# Patient Record
Sex: MALE | Race: BLACK OR AFRICAN AMERICAN | NOT HISPANIC OR LATINO | Employment: FULL TIME | URBAN - NONMETROPOLITAN AREA
[De-identification: names, ages, dates, MRNs, and addresses within clinical notes are randomized per-mention and may not be internally consistent; named-entity substitution may affect disease eponyms.]

---

## 2018-06-18 ENCOUNTER — TRANSCRIBE ORDERS (OUTPATIENT)
Dept: ORTHOPEDIC SURGERY | Facility: CLINIC | Age: 53
End: 2018-06-18

## 2018-06-18 DIAGNOSIS — M54.5 LOW BACK PAIN, UNSPECIFIED BACK PAIN LATERALITY, UNSPECIFIED CHRONICITY, WITH SCIATICA PRESENCE UNSPECIFIED: Primary | ICD-10-CM

## 2018-07-06 ENCOUNTER — OFFICE VISIT (OUTPATIENT)
Dept: ORTHOPEDIC SURGERY | Facility: CLINIC | Age: 53
End: 2018-07-06

## 2018-07-06 ENCOUNTER — DOCUMENTATION (OUTPATIENT)
Dept: ORTHOPEDIC SURGERY | Facility: CLINIC | Age: 53
End: 2018-07-06

## 2018-07-06 VITALS — BODY MASS INDEX: 31.02 KG/M2 | HEIGHT: 72 IN | WEIGHT: 229 LBS

## 2018-07-06 DIAGNOSIS — M54.5 LOW BACK PAIN, UNSPECIFIED BACK PAIN LATERALITY, UNSPECIFIED CHRONICITY, WITH SCIATICA PRESENCE UNSPECIFIED: Primary | ICD-10-CM

## 2018-07-06 DIAGNOSIS — M51.36 DDD (DEGENERATIVE DISC DISEASE), LUMBAR: ICD-10-CM

## 2018-07-06 DIAGNOSIS — G89.29 CHRONIC MIDLINE LOW BACK PAIN WITHOUT SCIATICA: Primary | ICD-10-CM

## 2018-07-06 DIAGNOSIS — M48.062 SPINAL STENOSIS OF LUMBAR REGION WITH NEUROGENIC CLAUDICATION: ICD-10-CM

## 2018-07-06 DIAGNOSIS — M54.50 CHRONIC MIDLINE LOW BACK PAIN WITHOUT SCIATICA: Primary | ICD-10-CM

## 2018-07-06 PROCEDURE — 99203 OFFICE O/P NEW LOW 30 MIN: CPT | Performed by: ORTHOPAEDIC SURGERY

## 2018-07-06 RX ORDER — LOSARTAN POTASSIUM AND HYDROCHLOROTHIAZIDE 25; 100 MG/1; MG/1
TABLET ORAL DAILY
Refills: 3 | COMMUNITY
Start: 2018-06-07

## 2018-07-06 RX ORDER — PEN NEEDLE, DIABETIC 32GX 5/32"
NEEDLE, DISPOSABLE MISCELLANEOUS
Refills: 5 | COMMUNITY
Start: 2018-06-07

## 2018-07-06 RX ORDER — CYCLOBENZAPRINE HCL 10 MG
TABLET ORAL
COMMUNITY
Start: 2018-06-13

## 2018-07-06 RX ORDER — ESCITALOPRAM OXALATE 10 MG/1
TABLET ORAL
Refills: 2 | COMMUNITY
Start: 2018-06-07

## 2018-07-06 RX ORDER — INSULIN GLARGINE 100 [IU]/ML
INJECTION, SOLUTION SUBCUTANEOUS
Refills: 0 | COMMUNITY
Start: 2018-06-13
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
## 2018-07-16 ENCOUNTER — HOSPITAL ENCOUNTER (OUTPATIENT)
Dept: MRI IMAGING | Facility: HOSPITAL | Age: 53
Discharge: HOME | End: 2018-07-16
Attending: ORTHOPAEDIC SURGERY | Admitting: ORTHOPAEDIC SURGERY

## 2018-07-16 DIAGNOSIS — M48.062 SPINAL STENOSIS OF LUMBAR REGION WITH NEUROGENIC CLAUDICATION: ICD-10-CM

## 2018-07-16 DIAGNOSIS — M51.36 DDD (DEGENERATIVE DISC DISEASE), LUMBAR: ICD-10-CM

## 2018-07-16 PROCEDURE — 72148 MRI LUMBAR SPINE W/O DYE: CPT

## 2018-08-08 ENCOUNTER — OFFICE VISIT (OUTPATIENT)
Dept: ORTHOPEDIC SURGERY | Facility: CLINIC | Age: 53
End: 2018-08-08

## 2018-08-08 VITALS — BODY MASS INDEX: 31.02 KG/M2 | HEIGHT: 72 IN | WEIGHT: 229 LBS

## 2018-08-08 DIAGNOSIS — M54.50 CHRONIC MIDLINE LOW BACK PAIN WITHOUT SCIATICA: ICD-10-CM

## 2018-08-08 DIAGNOSIS — M54.5 LOW BACK PAIN, UNSPECIFIED BACK PAIN LATERALITY, UNSPECIFIED CHRONICITY, WITH SCIATICA PRESENCE UNSPECIFIED: Primary | ICD-10-CM

## 2018-08-08 DIAGNOSIS — M48.062 SPINAL STENOSIS OF LUMBAR REGION WITH NEUROGENIC CLAUDICATION: ICD-10-CM

## 2018-08-08 DIAGNOSIS — G89.29 CHRONIC MIDLINE LOW BACK PAIN WITHOUT SCIATICA: ICD-10-CM

## 2018-08-08 DIAGNOSIS — M51.36 DDD (DEGENERATIVE DISC DISEASE), LUMBAR: ICD-10-CM

## 2018-08-08 PROCEDURE — 99213 OFFICE O/P EST LOW 20 MIN: CPT | Performed by: ORTHOPAEDIC SURGERY

## 2018-08-08 NOTE — PROGRESS NOTES
"Khanh Quinteros is a 52 y.o. male returns for     Chief Complaint   Patient presents with   • Lumbar Spine - Follow-up   • Results     MRI Faith 7/16/18       HISTORY OF PRESENT ILLNESS:       CONCURRENT MEDICAL HISTORY:    The following portions of the patient's history were reviewed and updated as appropriate: allergies, current medications, past family history, past medical history, past social history, past surgical history and problem list.     53 y/o complains of back pain, low back, the pain is dull, intermittent.  Worse with low pressure weather patterns.  C/o numbness of both legs left > right.  Aleve helps.  Flexeril helps.    No weakness  No nighttime awakening pain.    ROS  No fevers or chills.  No chest pain or shortness of air.  No GI or  disturbances.    PHYSICAL EXAMINATION:       Ht 182.9 cm (72\")   Wt 104 kg (229 lb)   BMI 31.06 kg/m²     Physical Exam   Constitutional: He is oriented to person, place, and time. He appears well-developed and well-nourished. No distress.   HENT:   Head: Normocephalic.   Mouth/Throat: No oropharyngeal exudate.   Eyes: Pupils are equal, round, and reactive to light. No scleral icterus.   Neck: No JVD present. No tracheal deviation present. No thyromegaly present.   Cardiovascular: Normal rate and intact distal pulses.    Pulmonary/Chest: Effort normal. No respiratory distress. He has no wheezes. He has no rales.   Abdominal: Soft. He exhibits no distension. There is no tenderness. There is no guarding.   Neurological: He is alert and oriented to person, place, and time. He displays normal reflexes. No cranial nerve deficit. Coordination normal.   Skin: Skin is warm and dry. Capillary refill takes less than 2 seconds. No erythema.   Psychiatric: He has a normal mood and affect. His behavior is normal.       GAIT:     [x]  Normal  []  Antalgic    Assistive device: [x]  None  []  Walker     []  Crutches  []  Cane     []  Wheelchair  []  Stretcher    Right Ankle " Exam     Muscle Strength   Dorsiflexion:  5/5  Plantar flexion:  5/5  Anterior tibial:  5/5  Posterior tibial:  5/5  Gastrocsoleus:  5/5  Peroneal muscle:  5/5      Left Ankle Exam     Muscle Strength   Dorsiflexion:  5/5   Plantar flexion:  5/5   Anterior tibial:  5/5   Posterior tibial:  5/5  Gastrocsoleus:  5/5  Peroneal muscle:  5/5      Right Knee Exam     Range of Motion   Extension: normal   Flexion: normal     Muscle Strength     The patient has normal right knee strength.    Other   Erythema: absent  Scars: absent      Left Knee Exam     Range of Motion   Extension: normal   Flexion: normal     Muscle Strength     The patient has normal left knee strength.    Other   Erythema: absent  Scars: absent      Back Exam     Tenderness   The patient is experiencing tenderness in the lumbar.    Range of Motion   Extension: 10   Flexion: 60   Lateral Bend Right: 10   Lateral Bend Left: 10   Rotation Right: 10   Rotation Left: 10     Muscle Strength   Right Quadriceps:  5/5   Left Quadriceps:  5/5   Right Hamstrings:  5/5   Left Hamstrings:  5/5     Tests   Straight leg raise right: negative  Straight leg raise left: negative    Other   Erythema: no back redness  Scars: absent              Mri Lumbar Spine Without Contrast    Result Date: 7/16/2018  Narrative: MRI lumbar spine. HISTORY: Back pain. Spinal stenosis. Prior exam lumbar spine x-ray July 6, 2018. Normal intervertebral discs. No evidence of any disc protrusion. No central canal stenosis. Facet arthrosis at L3-L4, L4-L5, L5-S1. Mild left-sided foraminal stenosis at L4-L5 and L5-S1. MRI lumbar spine without contrast is otherwise remarkable.     Impression: CONCLUSION: Multilevel facet arthrosis. This causes left-sided foraminal stenosis at L4-L5 and L5-S1. Otherwise unremarkable MRI lumbar spine. No evidence of any disc protrusion or central canal stenosis. No bony destructive lesions are present. Electronically signed by:  Dom Triana MD  7/16/2018 8:40 PM  CDT Workstation: [id number]    I reviewed the MRI of lumbar spine performed 7/16/2018, the MRI shows lumbar degenerative discogenic changes, mild moderate spinal stenosis of L3-4, L4-5 and L5-S1.          ASSESSMENT:    Diagnoses and all orders for this visit:    Low back pain, unspecified back pain laterality, unspecified chronicity, with sciatica presence unspecified  -     Ambulatory Referral to Physical Therapy Evaluate and treat, Ortho; Electrotherapy; Tens (Home), Iontophoresis; Soft Tissue Mobilizaton; Stretching, ROM, Strengthening; Full weight bearing    Chronic midline low back pain without sciatica  -     Ambulatory Referral to Physical Therapy Evaluate and treat, Ortho; Electrotherapy; Tens (Home), Iontophoresis; Soft Tissue Mobilizaton; Stretching, ROM, Strengthening; Full weight bearing    Spinal stenosis of lumbar region with neurogenic claudication    DDD (degenerative disc disease), lumbar          PLAN    I reviewed MRI discussed non operative treatment options.  All questions are answered.  I offerred a lumbar epidural steroid injection, which he has declined.  I advised him for weight loss.    Physical therapy is ordered for him.      Javan Matos MD

## 2018-08-22 ENCOUNTER — HOSPITAL ENCOUNTER (OUTPATIENT)
Dept: PHYSICAL THERAPY | Facility: HOSPITAL | Age: 53
Setting detail: THERAPIES SERIES
Discharge: HOME | End: 2018-08-22
Attending: ORTHOPAEDIC SURGERY

## 2018-08-22 DIAGNOSIS — M54.50 CHRONIC MIDLINE LOW BACK PAIN WITHOUT SCIATICA: Primary | ICD-10-CM

## 2018-08-22 DIAGNOSIS — G89.29 CHRONIC MIDLINE LOW BACK PAIN WITHOUT SCIATICA: Primary | ICD-10-CM

## 2018-08-22 PROCEDURE — 97110 THERAPEUTIC EXERCISES: CPT | Performed by: PHYSICAL THERAPIST

## 2018-08-22 PROCEDURE — 97162 PT EVAL MOD COMPLEX 30 MIN: CPT | Performed by: PHYSICAL THERAPIST
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
## 2018-08-23 ENCOUNTER — HOSPITAL ENCOUNTER (OUTPATIENT)
Dept: PHYSICAL THERAPY | Facility: HOSPITAL | Age: 53
Setting detail: THERAPIES SERIES
Discharge: HOME | End: 2018-08-23

## 2018-08-23 DIAGNOSIS — M54.50 CHRONIC MIDLINE LOW BACK PAIN WITHOUT SCIATICA: Primary | ICD-10-CM

## 2018-08-23 DIAGNOSIS — G89.29 CHRONIC MIDLINE LOW BACK PAIN WITHOUT SCIATICA: Primary | ICD-10-CM

## 2018-08-23 PROCEDURE — G0283 ELEC STIM OTHER THAN WOUND: HCPCS | Performed by: PHYSICAL THERAPY ASSISTANT

## 2018-08-23 PROCEDURE — 97110 THERAPEUTIC EXERCISES: CPT | Performed by: PHYSICAL THERAPY ASSISTANT

## 2018-08-23 NOTE — THERAPY TREATMENT NOTE
Outpatient Physical Therapy Ortho Treatment Note  [facility]     Patient Name: Khanh Quinteros  : 1965  MRN: [id number]  Today's Date: 2018      Visit Date: 2018  Reports N/A% of improvement.  Attended 2/2 visits.  Insurance available: medical necessity  Next MD appt: [id number] .  Recertification: 2018.  Visit Dx:    ICD-10-CM ICD-9-CM   1. Chronic midline low back pain without sciatica M54.5 724.2    G89.29 338.29       Patient Active Problem List   Diagnosis   • Chronic midline low back pain   • DDD (degenerative disc disease), lumbar   • Spinal stenosis of lumbar region with neurogenic claudication        Past Medical History:   Diagnosis Date   • Depression    • Diabetes (CMS/[facility])    • Hypertension    • Seizure (CMS/[facility])     As of 2018; reported last seizure was    • Sleep apnea         Past Surgical History:   Procedure Laterality Date   • NECK SURGERY      Fusion in the neck             PT Ortho     Row Name 18 0900       Subjective Comments    Subjective Comments Patient wants to get to a place where he has less issues with it and be able to manage the pain better.  -AJ       Precautions and Contraindications    Precautions Cervical fusion  -AJ       Subjective Pain    Able to rate subjective pain? yes  -AJ    Pre-Treatment Pain Level 7  -AJ    Post-Treatment Pain Level 6  -AJ       Posture/Observations    Alignment Options Forward head;Cervical lordosis;Thoracic kyphosis;Rounded shoulders;Scoliosis;Iliac crests;Lumbar lordosis  -AJ    Forward Head Mild;Increased  -AJ    Cervical Lordosis Mild;Increased  -AJ    Thoracic Kyphosis Mild;Increased  -AJ    Rounded Shoulders Bilateral:;Mild;Increased  -AJ    Scoliosis Normal  -AJ    Lumbar lordosis Normal  -AJ    Iliac crests Bilateral:;Normal   Pelvis is level, no LLD to note  -AJ    Posture/Observations Comments No acute distress, fair overall postural awareness.  -AJ      User Key  (r) = Recorded  "By, (t) = Taken By, (c) = Cosigned By    Initials Name Provider Type    Michelle Herrera, PT Physical Therapist                            PT Assessment/Plan     Row Name 08/23/18 1000          PT Assessment    Assessment Comments Good tolerance with all ther ex, Pt had decrease in pain with aquatics this date no adverse effects with estim.  -        PT Plan    PT Frequency 2x/week   land pool  -     PT Plan Comments establish HEP  -       User Key  (r) = Recorded By, (t) = Taken By, (c) = Cosigned By    Initials Name Provider Type    Noah Lara PTA Physical Therapy Assistant                Modalities     Row Name 08/23/18 0900             Ice    Ice Applied Yes  -      Location low back  -      Rx Minutes 15 mins  -      Ice S/P Rx Yes  -         ELECTRICAL STIMULATION    Attended/Unattended Unattended  -      Stimulation Type IFC  -      Location/Electrode Placement/Other low back  -      91014 - PT Electrical Stimulation (Manual) Minutes 15  -        User Key  (r) = Recorded By, (t) = Taken By, (c) = Cosigned By    Initials Name Provider Type    Noah Lara PTA Physical Therapy Assistant                Exercises     Row Name 08/23/18 0903 08/23/18 0800          Subjective Comments    Subjective Comments  -- Pt reports he is doing ok this date because the weather is better.  -        Subjective Pain    Able to rate subjective pain?  -- yes  -     Pre-Treatment Pain Level  -- 6  -JH        Aquatics    Aquatics performed?  -- Yes  -        Exercise 1    Exercise Name 1 PRO ll  - AQU walk f,r,l  -     Time 1 10  -JH 4' ea  -     Additional Comments L 4.0  -  --        Exercise 2    Exercise Name 2 ST HS stretch  -JH AQU wand rot  -     Reps 2 2  -JH 20  -JH     Time 2 30\"  -JH  --        Exercise 3    Exercise Name 3 LTR  -JH AQU wand rows  -     Reps 3 10  -JH 20  -JH     Time 3 10\" hold  -  --        Exercise 4    Exercise Name 4 bridges  -JH AQU MS  " "-     Reps 4 20  -JH 20  -JH     Time 4 4\" hold  -  --        Exercise 5    Exercise Name 5 BKLL  - AQU 3 way SLR benjamín  -     Sets 5 2  -JH  --     Reps 5 10  -JH 20  -JH        Exercise 6    Exercise Name 6 supine hip add  -JH AQU deep bike  -     Sets 6 2  -JH  --     Reps 6 10  -JH  --     Time 6 5\" hold  - 4'  -        Exercise 7    Exercise Name 7 THOMAS  - AQU deep hang  -     Time 7 5'  - 4'  -       User Key  (r) = Recorded By, (t) = Taken By, (c) = Cosigned By    Initials Name Provider Type    Noah Lara, TAE Physical Therapy Assistant                               PT OP Goals     Row Name 08/23/18 1000          PT Short Term Goals    STG Date to Achieve 09/12/18  -     STG 1 I with HEP and have additions/changes by next recertification.  -     STG 2 AROM B ROT WNL.  -     STG 3 Patient able to show a proper log roll technique.  -     STG 4 L HS 4+/5 or greater.  -     STG 5 AROM lumbar flexion >= 75°.  -     STG 6 Patient able toambulate F/R/L 5 min each aquatically with no increase in pain.  -     STG 6 Progress New  -     STG 6 Progress Comments .  -        Long Term Goals    LTG Date to Achieve 09/28/18  -     LTG 1 AROM for lumbar spine all WNL, no increase in pain.  -     LTG 2 B LE 5/5.  -     LTG 3 Negative slump B.  -     LTG 4 Patient able to perform 15 reps of all aquatics with no increase in pain.  -     LTG 5 I with all aquatic ther ex.  -     LTG 6 Patient to show proper lifting technique floor ot waist to shoulder level.  -     LTG 7 I with land final HEP.  -     LTG 8 D/C with a final HEP and free 30 day fitness fdormula memebership.  -        Time Calculation    PT Goal Re-Cert Due Date 09/22/18  -       User Key  (r) = Recorded By, (t) = Taken By, (c) = Cosigned By    Initials Name Provider Type    Noah Lara PTA Physical Therapy Assistant                         Time Calculation:   Start Time: 0900  Stop Time: " 1000  Time Calculation (min): 60 min  Therapy Suggested Charges     Code   Minutes Charges    09445 (CPT®) Hc Pt Neuromusc Re Education Ea 15 Min      75024 (CPT®) Hc Pt Ther Proc Ea 15 Min      82222 (CPT®) Hc Gait Training Ea 15 Min      68469 (CPT®) Hc Pt Therapeutic Act Ea 15 Min      76161 (CPT®) Hc Pt Manual Therapy Ea 15 Min      21183 (CPT®) Hc Pt Ther Massage- Per 15 Min      16307 (CPT®) Hc Pt Iontophoresis Ea 15 Min      77212 (CPT®) Hc Pt Elec Stim Ea-Per 15 Min 15 1    62357 (CPT®) Hc Pt Ultrasound Ea 15 Min      03655 (CPT®) Hc Pt Self Care/Mgmt/Train Ea 15 Min      36926 (CPT®) Hc Pt Prosthetic (S) Train Initial Encounter, Each 15 Min      11777 (CPT®) Hc Orthotic(S) Mgmt/Train Initial Encounter, Each 15min      13855 (CPT®) Hc Pt Aquatic Therapy Ea 15 Min      83007 (CPT®) Hc Pt Orthotic(S)/Prosthetic(S) Encounter, Each 15 Min      Total  15 1        Therapy Charges for Today     Code Description Service Date Service Provider Modifiers Qty    92210067625 HC PT THER PROC EA 15 MIN 8/23/2018 Noah Zapata, PTA GP 6    38218359806 HC PT ELECTRICAL STIM UNATTENDED 8/23/2018 Noah Zapata, PTA  1                    Noah Zapata, PTA  8/23/2018

## 2018-08-24 ENCOUNTER — APPOINTMENT (OUTPATIENT)
Dept: PHYSICAL THERAPY | Facility: HOSPITAL | Age: 53
End: 2018-08-24

## 2018-08-29 ENCOUNTER — APPOINTMENT (OUTPATIENT)
Dept: PHYSICAL THERAPY | Facility: HOSPITAL | Age: 53
End: 2018-08-29

## 2018-08-29 ENCOUNTER — HOSPITAL ENCOUNTER (OUTPATIENT)
Dept: PHYSICAL THERAPY | Facility: HOSPITAL | Age: 53
Setting detail: THERAPIES SERIES
Discharge: HOME | End: 2018-08-29

## 2018-08-29 DIAGNOSIS — G89.29 CHRONIC MIDLINE LOW BACK PAIN WITHOUT SCIATICA: Primary | ICD-10-CM

## 2018-08-29 DIAGNOSIS — M54.50 CHRONIC MIDLINE LOW BACK PAIN WITHOUT SCIATICA: Primary | ICD-10-CM

## 2018-08-29 PROCEDURE — 97110 THERAPEUTIC EXERCISES: CPT

## 2018-08-29 NOTE — THERAPY TREATMENT NOTE
Outpatient Physical Therapy Ortho Treatment Note  [facility]  Jodi Diaz ATC       Patient Name: Khanh Quinteros  : 1965  MRN: [id number]  Today's Date: 2018      Visit Date: 2018  Pt reports 8/10 pain pre treatment, 6/10 pain post treatment  Reports -% of improvement.  Attended 3/3 visits.  Insurance available: medical [facility]  Next MD appt: [id number].  Recertification: 2018.  Visit Dx:    ICD-10-CM ICD-9-CM   1. Chronic midline low back pain without sciatica M54.5 724.2    G89.29 338.29       Patient Active Problem List   Diagnosis   • Chronic midline low back pain   • DDD (degenerative disc disease), lumbar   • Spinal stenosis of lumbar region with neurogenic claudication        Past Medical History:   Diagnosis Date   • Depression    • Diabetes (CMS/[facility])    • Hypertension    • Seizure (CMS/[facility])     As of 2018; reported last seizure was    • Sleep apnea         Past Surgical History:   Procedure Laterality Date   • NECK SURGERY      Fusion in the neck             PT Ortho     Row Name 18 0800       Subjective Comments    Subjective Comments (P)  states that his body is telling him it is about to rain and has increased pain and tightness this date due to the weather change  -HB       Precautions and Contraindications    Precautions (P)  Cervical fusion  -HB       Subjective Pain    Able to rate subjective pain? (P)  yes  -HB    Pre-Treatment Pain Level (P)  8  -HB    Post-Treatment Pain Level (P)  6  -HB       Posture/Observations    Posture/Observations Comments (P)  No acute distress  -HB      User Key  (r) = Recorded By, (t) = Taken By, (c) = Cosigned By    Initials Name Provider Type    Jodi Sharma ATC                             PT Assessment/Plan     Row Name 18 0900          PT Assessment    Assessment Comments (P)  tolerated therex well this date. needed cues on proper technique with most of aquatics  -HB         PT Plan    PT Frequency (P)  2x/week   L/P  -HB     PT Plan Comments (P)  cont and progress as able  -HB       User Key  (r) = Recorded By, (t) = Taken By, (c) = Cosigned By    Initials Name Provider Type    HB Jodi Diaz, ATC                     Exercises     Row Name 08/29/18 0900 08/29/18 0800          Subjective Comments    Subjective Comments  -- (P)  states that his body is telling him it is about to rain and has increased pain and tightness this date due to the weather change  -HB        Subjective Pain    Able to rate subjective pain?  -- (P)  yes  -HB     Pre-Treatment Pain Level  -- (P)  8  -HB     Post-Treatment Pain Level  -- (P)  6  -HB        Aquatics    Aquatics performed? (P)  Yes  -HB  --        Exercise 1    Exercise Name 1 (P)  Aqua: walk   -HB (P)  PRO ll  -HB     Time 1 (P)  5 minutes each   -HB (P)  10 minutes  -HB     Additional Comments  -- (P)  L 4.0  -HB        Exercise 2    Exercise Name 2 (P)  Aqua: wand circles   -HB (P)  ST HS stretch  -HB     Reps 2 (P)  10 f/b  -HB (P)  2  -HB     Time 2  -- (P)  30 sec  -HB        Exercise 3    Exercise Name 3 (P)  Aqua: 3 way shoulder paddles   -HB (P)  seated piriformis S  -HB     Reps 3 (P)  15   -HB (P)  2  -HB     Time 3  -- (P)  30 sec  -HB     Additional Comments (P)  yp  -HB  --        Exercise 4    Exercise Name 4 (P)  Aqua: 3 way SLR  -HB (P)  LTR  -HB     Reps 4 (P)  15  -HB (P)  10  -HB     Time 4  -- (P)  10 sec  -HB        Exercise 5    Exercise Name 5 (P)  Aqua: 3 way SLR  -HB (P)  bridges   -HB     Reps 5 (P)  15  -HB (P)  20  -HB        Exercise 6    Exercise Name 6 (P)  Aqua: kickboard pushdowns   -HB (P)  DKTC rolls   -HB     Reps 6 (P)  20  -HB (P)  20  -HB        Exercise 7    Exercise Name 7 (P)  Aqua: kickboard pushpulls  -HB (P)  prone heel squeezes  -HB     Reps 7 (P)  20  -HB (P)  20  -HB        Exercise 8    Exercise Name 8 (P)  Aqua: CR/TR  -HB (P)  THOMAS   -HB     Reps 8 (P)  20  -HB  --     Time 8  --  (P)  5 minutes  -HB        Exercise 9    Exercise Name 9 (P)  Aqua: MS  -HB  --     Reps 9 (P)  20  -HB  --        Exercise 10    Exercise Name 10 (P)  Aqua: Hang  -HB  --     Time 10 (P)  5 minutes  -HB  --       User Key  (r) = Recorded By, (t) = Taken By, (c) = Cosigned By    Initials Name Provider Type    HB Jodi Diaz, ATC                                PT OP Goals     Row Name 08/29/18 0900 08/29/18 0800       PT Short Term Goals    STG Date to Achieve  -- (P)  09/12/18  -HB    STG 1  -- (P)  I with HEP and have additions/changes by next recertification.  -HB    STG 1 Progress  -- (P)  Ongoing  -HB    STG 2  -- (P)  AROM B ROT WNL.  -HB    STG 2 Progress  -- (P)  Ongoing  -HB    STG 3  -- (P)  Patient able to show a proper log roll technique.  -HB    STG 3 Progress  -- (P)  Ongoing  -HB    STG 4  -- (P)  L HS 4+/5 or greater.  -HB    STG 4 Progress  -- (P)  Ongoing  -HB    STG 5  -- (P)  AROM lumbar flexion >= 75°.  -HB    STG 5 Progress  -- (P)  Ongoing  -HB    STG 6  -- (P)  Patient able toambulate F/R/L 5 min each aquatically with no increase in pain.  -HB    STG 6 Progress  -- (P)  Ongoing  -HB    STG 6 Progress Comments  -- (P)  .  -HB       Long Term Goals    LTG Date to Achieve  -- (P)  09/28/18  -HB    LTG 1  -- (P)  AROM for lumbar spine all WNL, no increase in pain.  -HB    LTG 1 Progress  -- (P)  Ongoing  -HB    LTG 2  -- (P)  B LE 5/5.  -HB    LTG 2 Progress  -- (P)  Ongoing  -HB    LTG 3  -- (P)  Negative slump B.  -HB    LTG 3 Progress  -- (P)  Ongoing  -HB    LTG 4  -- (P)  Patient able to perform 15 reps of all aquatics with no increase in pain.  -HB    LTG 4 Progress  -- (P)  Ongoing  -HB    LTG 5  -- (P)  I with all aquatic ther ex.  -HB    LTG 5 Progress  -- (P)  Ongoing  -HB    LTG 6  -- (P)  Patient to show proper lifting technique floor ot waist to shoulder level.  -HB    LTG 6 Progress  -- (P)  Ongoing  -HB    LTG 7  -- (P)  I with land final HEP.  -HB    LTG 7  Progress  -- (P)  Ongoing  -HB    LTG 8  -- (P)  D/C with a final HEP and free 30 day fitness fdormula memebership.  -HB    LTG 8 Progress  -- (P)  Ongoing  -HB       Time Calculation    PT Goal Re-Cert Due Date (P)  09/12/18  -HB (P)  09/12/18  -HB      User Key  (r) = Recorded By, (t) = Taken By, (c) = Cosigned By    Initials Name Provider Type    Jodi Sharma ATC                          Time Calculation:   Start:0805  Stop:0845  40 minutes  Start Time: (P) 0850  Stop Time: (P) 0928  Time Calculation (min): (P) 38 min  Total Timed Code Minutes- PT: (P) 38 minute(s)   Total combined: 78 minutes    Therapy Charges for Today     Code Description Service Date Service Provider Modifiers Qty    08829601874 HC PT THER PROC EA 15 MIN 8/29/2018 Jodi Diaz ATC  5                    Jodi Diaz ATC  8/29/2018

## 2018-08-31 ENCOUNTER — APPOINTMENT (OUTPATIENT)
Dept: PHYSICAL THERAPY | Facility: HOSPITAL | Age: 53
End: 2018-08-31

## 2018-08-31 ENCOUNTER — HOSPITAL ENCOUNTER (OUTPATIENT)
Dept: PHYSICAL THERAPY | Facility: HOSPITAL | Age: 53
Setting detail: THERAPIES SERIES
Discharge: HOME | End: 2018-08-31

## 2018-08-31 DIAGNOSIS — M54.50 CHRONIC MIDLINE LOW BACK PAIN WITHOUT SCIATICA: Primary | ICD-10-CM

## 2018-08-31 DIAGNOSIS — G89.29 CHRONIC MIDLINE LOW BACK PAIN WITHOUT SCIATICA: Primary | ICD-10-CM

## 2018-08-31 PROCEDURE — 97110 THERAPEUTIC EXERCISES: CPT

## 2018-09-05 ENCOUNTER — HOSPITAL ENCOUNTER (OUTPATIENT)
Dept: PHYSICAL THERAPY | Facility: HOSPITAL | Age: 53
Setting detail: THERAPIES SERIES
Discharge: HOME | End: 2018-09-05

## 2018-09-05 DIAGNOSIS — G89.29 CHRONIC MIDLINE LOW BACK PAIN WITHOUT SCIATICA: Primary | ICD-10-CM

## 2018-09-05 DIAGNOSIS — M54.50 CHRONIC MIDLINE LOW BACK PAIN WITHOUT SCIATICA: Primary | ICD-10-CM

## 2018-09-05 PROCEDURE — 97110 THERAPEUTIC EXERCISES: CPT | Performed by: SPECIALIST/TECHNOLOGIST

## 2018-09-05 NOTE — THERAPY TREATMENT NOTE
"    Outpatient Physical Therapy Ortho Treatment Note  [facility]     Patient Name: Khanh Quinteros  : 1965  MRN: [id number]  Today's Date: 2018      Visit Date: 2018   Patients reports pain as:  8/10   Patient reports overall % improvement as:  \"some\"   Patients attendance has been:     Insurance visits available  n/a   Recert Date is:  18   Next MD visit is:  TBD in 2018       Visit Dx:    ICD-10-CM ICD-9-CM   1. Chronic midline low back pain without sciatica M54.5 724.2    G89.29 338.29       Patient Active Problem List   Diagnosis   • Chronic midline low back pain   • DDD (degenerative disc disease), lumbar   • Spinal stenosis of lumbar region with neurogenic claudication        Past Medical History:   Diagnosis Date   • Depression    • Diabetes (CMS/[facility])    • Hypertension    • Seizure (CMS/[facility])     As of 2018; reported last seizure was    • Sleep apnea         Past Surgical History:   Procedure Laterality Date   • NECK SURGERY      Fusion in the neck                             PT Assessment/Plan     Row Name 18 0900          PT Assessment    Assessment Comments (P)  brenden rx.  able to progress w/ additional ther-ex to promote mobility and stability.    -ML       User Key  (r) = Recorded By, (t) = Taken By, (c) = Cosigned By    Initials Name Provider Type    Mukul Glasgow, ATC                     Exercises     Row Name 18 0918 0800          Subjective Comments    Subjective Comments (P)  pt reports px. centralized to LB.  no radicular symptoms today, but occasionally experiences.   -ML (P)  tight and sore today.  pain still there.   -ML        Subjective Pain    Able to rate subjective pain?  -- (P)  yes  -ML     Pre-Treatment Pain Level (P)  7  -ML (P)  8  -ML     Post-Treatment Pain Level (P)  7  -ML (P)  7  -ML        Aquatics    Aquatics performed?  -- (P)  Yes  -ML        Exercise 1    Exercise Name 1 (P)  AQ: " walk fwd and retro  -ML (P)  pro2 LE ROM  -ML     Time 1 (P)  5 min  -ML (P)  10 min  -ML     Additional Comments (P)  fwd to deep/ retro to shallow  -ML (P)  L4.0 / S=10  -ML        Exercise 2    Exercise Name 2 (P)  AQ: walk lat  -ML (P)  St Ham S  -ML     Sets 2  -- (P)  2  -ML     Time 2 (P)  5 min  -ML (P)  30  -ML        Exercise 3    Exercise Name 3 (P)  AQ: wand st rot  -ML (P)  Sit Pirif S  -ML     Sets 3  -- (P)  2  -ML     Reps 3 (P)  20  -ML  --     Time 3  -- (P)  30 sec  -ML        Exercise 4    Exercise Name 4 (P)  AQ: CR/TR  -ML (P)  Sit to stands w/ Lx ext.  -ML     Sets 4  -- (P)  2  -ML     Reps 4 (P)  20  -ML (P)  10  -ML     Additional Comments  -- (P)  5 sec hold  -ML        Exercise 5    Exercise Name 5 (P)  AQ: 3 way Hip SLR  -ML (P)  Bridges w/ add (small ball)  -ML     Sets 5  -- (P)  3  -ML     Reps 5 (P)  15  -ML (P)  10  -ML     Additional Comments  -- (P)  3 sec hold w/ LTR b/w sets  -ML        Exercise 6    Exercise Name 6 (P)  AQ: KB push pull  -ML (P)  Swiss: DK-C rolls  -ML     Reps 6 (P)  20  -ML (P)  20  -ML        Exercise 7    Exercise Name 7 (P)  AQ: KB up/down  -ML (P)  St. [place] s w/ lx ext  -ML     Sets 7  -- (P)  2  -ML     Reps 7 (P)  20  -ML  --     Time 7  -- (P)  30 sec  -ML        Exercise 8    Exercise Name 8 (P)  AQ: DW bike  -ML  --     Time 8 (P)  5 min  -ML  --        Exercise 9    Exercise Name 9 (P)  AQ: DW hang  -ML  --     Time 9 (P)  5 min  -ML  --       User Key  (r) = Recorded By, (t) = Taken By, (c) = Cosigned By    Initials Name Provider Type    Mukul Glasgow, ATC                                PT OP Goals     Row Name 09/05/18 0900 09/05/18 0800       PT Short Term Goals    STG Date to Achieve  -- (P)  09/12/18  -ML    STG 1  -- (P)  I with HEP and have additions/changes by next recertification.  -ML    STG 1 Progress  -- (P)  Ongoing  -ML    STG 2  -- (P)  AROM B ROT WNL.  -ML    STG 2 Progress  -- (P)  Ongoing  -ML    STG 3  --  (P)  Patient able to show a proper log roll technique.  -ML    STG 3 Progress  -- (P)  Ongoing  -ML    STG 4  -- (P)  L HS 4+/5 or greater.  -ML    STG 4 Progress  -- (P)  Ongoing  -ML    STG 5  -- (P)  AROM lumbar flexion >= 75°.  -ML    STG 5 Progress  -- (P)  Ongoing  -ML    STG 6  -- (P)  Patient able toambulate F/R/L 5 min each aquatically with no increase in pain.  -ML    STG 6 Progress  -- (P)  Ongoing  -ML    STG 6 Progress Comments  -- (P)  .  -ML       Long Term Goals    LTG Date to Achieve  -- (P)  09/28/18  -ML    LTG 1  -- (P)  AROM for lumbar spine all WNL, no increase in pain.  -ML    LTG 1 Progress  -- (P)  Ongoing  -ML    LTG 2  -- (P)  B LE 5/5.  -ML    LTG 2 Progress  -- (P)  Ongoing  -ML    LTG 3  -- (P)  Negative slump B.  -ML    LTG 3 Progress  -- (P)  Ongoing  -ML    LTG 4  -- (P)  Patient able to perform 15 reps of all aquatics with no increase in pain.  -ML    LTG 4 Progress  -- (P)  Ongoing  -ML    LTG 5  -- (P)  I with all aquatic ther ex.  -ML    LTG 5 Progress  -- (P)  Ongoing  -ML    LTG 6  -- (P)  Patient to show proper lifting technique floor ot waist to shoulder level.  -ML    LTG 6 Progress  -- (P)  Ongoing  -ML    LTG 7  -- (P)  I with land final HEP.  -ML    LTG 7 Progress  -- (P)  Ongoing  -ML    LTG 8  -- (P)  D/C with a final HEP and free 30 day fitness fdormula memebership.  -ML    LTG 8 Progress  -- (P)  Ongoing  -ML       Time Calculation    PT Goal Re-Cert Due Date (P)  09/12/18  -ML (P)  09/12/18  -ML      User Key  (r) = Recorded By, (t) = Taken By, (c) = Cosigned By    Initials Name Provider Type    Mukul Glasgow, [facility]                    AQ: [phone]  Land: [phone]      Time Calculation:   Start Time: (P) 0995  Stop Time: (P) 0900  Time Calculation (min): (P) 38 min  Therapy Suggested Charges     Code   Minutes Charges    None           Therapy Charges for Today     Code Description Service Date Service Provider Modifiers Qty    03751072888  PT  THER SUPP EA 15 MIN 9/5/2018 Mukul aCntu, ATC  1    87630560998 HC PT THER PROC EA 15 MIN 9/5/2018 Mukul Cantu, ATC  5                    Mukul Cantu, ATC  9/5/2018

## 2018-09-06 ENCOUNTER — APPOINTMENT (OUTPATIENT)
Dept: PHYSICAL THERAPY | Facility: HOSPITAL | Age: 53
End: 2018-09-06

## 2018-09-11 ENCOUNTER — HOSPITAL ENCOUNTER (OUTPATIENT)
Dept: PHYSICAL THERAPY | Facility: HOSPITAL | Age: 53
Setting detail: THERAPIES SERIES
Discharge: HOME | End: 2018-09-11

## 2018-09-11 DIAGNOSIS — M54.50 CHRONIC MIDLINE LOW BACK PAIN WITHOUT SCIATICA: Primary | ICD-10-CM

## 2018-09-11 DIAGNOSIS — G89.29 CHRONIC MIDLINE LOW BACK PAIN WITHOUT SCIATICA: Primary | ICD-10-CM

## 2018-09-11 PROCEDURE — 97110 THERAPEUTIC EXERCISES: CPT | Performed by: PHYSICAL THERAPIST

## 2018-09-11 PROCEDURE — 97110 THERAPEUTIC EXERCISES: CPT

## 2018-09-11 NOTE — THERAPY PROGRESS REPORT/RE-CERT
Outpatient Physical Therapy Ortho Progress Note  [facility]  Michelle Frank, PT, DPT, CSCS       Patient Name: Khanh Quinteros  : 1965  MRN: [id number]  Today's Date: 2018      Visit Date: 2018     Pt reports 8/10 pain pre treatment, 7/10 pain post treatment  Reports 10-15% of improvement.  Attended 6/7 visits.  Insurance available: medical necessity  Next MD appt: [email or web address] .  Recertification: 10/02/2018.    Visit Dx:    ICD-10-CM ICD-9-CM   1. Chronic midline low back pain without sciatica M54.5 724.2    G89.29 338.29       Patient Active Problem List   Diagnosis   • Chronic midline low back pain   • DDD (degenerative disc disease), lumbar   • Spinal stenosis of lumbar region with neurogenic claudication        Past Medical History:   Diagnosis Date   • Depression    • Diabetes (CMS/[facility])    • Hypertension    • Seizure (CMS/[facility])     As of 2018; reported last seizure was    • Sleep apnea         Past Surgical History:   Procedure Laterality Date   • NECK SURGERY      Fusion in the neck             PT Ortho     Row Name 18 1000       Subjective Comments    Subjective Comments (P)  reports that he is in ablot of pain this date. states he is unable to do pool tis visit  -HB       Subjective Pain    Able to rate subjective pain? (P)  yes  -HB    Pre-Treatment Pain Level (P)  8  -HB       Posture/Observations    Alignment Options Forward head;Cervical lordosis;Thoracic kyphosis;Rounded shoulders;Scoliosis;Iliac crests;Lumbar lordosis  -AJ    Forward Head Mild;Increased  -AJ    Cervical Lordosis Mild;Increased  -AJ    Thoracic Kyphosis Mild;Increased  -AJ    Rounded Shoulders Bilateral:;Mild;Increased  -AJ    Scoliosis Normal  -AJ    Lumbar lordosis Normal  -AJ    Iliac crests Bilateral:;Normal   Pelvis is level, no LLD to note  -AJ    Posture/Observations Comments no acute distress   Fair overall postural awareness.  -AJ       Lumbar/SI Special Tests     Standing Flexion Test (SI Dysfunction) Bilateral:;Negative  -AJ    Stork Test (SI Dysfunction) Bilateral:;Negative  -AJ    Trendelenburg Test (Gluteus Medius Weakness) Bilateral:;Negative  -AJ    Slump Test (Neural Tension) Right:;Positive;Left:;Negative  -AJ    Fariba Jaxon Test (HNP) Negative  -AJ    SLR (Neural Tension) Bilateral:;Negative  -AJ    SI Compression Test (SI Dysfunction) Bilateral:;Negative  -AJ    SI Distraction Test (SI Dysfunction) Bilateral:;Negative  -AJ    ROMEO (hip vs. SI Dysfunction) Bilateral:;Negative  -AJ    FAIR Test (Piriformis Syndrome) Bilateral:;Negative  -AJ    Sacral Spring Test (SI Dysfunction) Negative  -AJ       Jeri's Signs    Superficial and non-anatomical tenderness Negative  -AJ    Simulation test Negative  -AJ    Distraction straight leg raise test (sitting vs supine) Negative  -AJ    Regional disturbances Negative  -AJ    Overreaction to examination Negative  -AJ       Head/Neck/Trunk    Trunk Extension AROM 30°  -AJ    Trunk Flexion AROM 58°  -AJ    Trunk Lt Lateral Flexion AROM WNL  -AJ    Trunk Rt Lateral Flexion AROM WNL  -AJ    Trunk Lt Rotation AROM WNL  -AJ    Trunk Rt Rotation AROM WNL  -AJ       MMT (Manual Muscle Testing)    General MMT Comments B LE 5/5  -AJ       Sensation    Sensation WNL? WNL  -AJ    Light Touch No apparent deficits  -AJ       Transfers    Comment (Transfers) I with all transfers, poor log roll technique.  -AJ       Gait/Stairs Assessment/Training    Comment (Gait/Stairs) FWB, non-antalgic gait, no distress.  -AJ      User Key  (r) = Recorded By, (t) = Taken By, (c) = Cosigned By    Initials Name Provider Type    AJ Michelle Frank, PT Physical Therapist    Jodi Sharma, ATC          Barriers to Rehab: Include significant or possible arthritic/degenerative changes that have occurred within the spine.    Safety Issues: None noted.            PT Assessment/Plan     Row Name 09/11/18 1100          PT Assessment     Functional Limitations Limitation in home management;Limitations in community activities;Performance in leisure activities;Performance in work activities  -     Impairments Balance;Endurance;Impaired flexibility;Impaired muscle endurance;Impaired muscle length;Impaired muscle power;Range of motion;Posture;Poor body mechanics;Pain;Muscle strength;Joint mobility;Joint integrity  -     Assessment Comments Patient is progressing well and has improved ROM and strength overall.  -AJ     Rehab Potential Fair  -AJ     Patient/caregiver participated in establishment of treatment plan and goals Yes  -AJ     Patient would benefit from skilled therapy intervention Yes  -AJ        PT Plan    PT Frequency 2x/week   Land/Pool  -AJ     Predicted Duration of Therapy Intervention (Therapy Eval) 2-3 more weeks, 4-6 more visits  -AJ     Planned CPT's? PT RE-EVAL: 94404;PT THER PROC EA 15 MIN: 75628;PT THER ACT EA 15 MIN: 02407;PT MANUAL THERAPY EA 15 MIN: 03964;PT ELECTRICAL STIM UNATTEND: ;PT THER SUPP EA 15 MIN  -     Physical Therapy Interventions (Optional Details) aquatics exercise;gait training;gross motor skills;home exercise program;joint mobilization;lumbar stabilization;manual therapy techniques;modalities;ROM (Range of Motion);strengthening;stretching;postural re-education;patient/family education  -     PT Plan Comments Progress overall strength and core stab.  -       User Key  (r) = Recorded By, (t) = Taken By, (c) = Cosigned By    Initials Name Provider Type    Michelle Herrera, PT Physical Therapist       Other therapeutic activities and/or exercises will be prescribed depending on the patients progress or lack there of.          Modalities     Row Name 09/11/18 1000             Ice    Patient denies application of Ice Yes  -        User Key  (r) = Recorded By, (t) = Taken By, (c) = Cosigned By    Initials Name Provider Type    Michelle Herrera, PT Physical Therapist                 Exercises     Row Name 09/11/18 1000             Subjective Comments    Subjective Comments (P)  reports that he is in ablot of pain this date. states he is unable to do pool tis visit  -HB         Subjective Pain    Able to rate subjective pain? (P)  yes  -HB      Pre-Treatment Pain Level (P)  8  -HB      Post-Treatment Pain Level 7  -AJ         Exercise 1    Exercise Name 1 (P)  Pro 2 L4.0  -HB      Time 1 (P)  10 minutes  -HB         Exercise 2    Exercise Name 2 (P)  st ham S  -HB      Reps 2 (P)  2  -HB      Time 2 (P)  30 sec  -HB         Exercise 3    Exercise Name 3 (P)  seated piriformis   -HB      Reps 3 (P)  2  -HB      Time 3 (P)  30 sec  -HB         Exercise 4    Exercise Name 4 (P)  sit to stand w/ lx ext  -HB      Reps 4 (P)  20  -HB         Exercise 5    Exercise Name 5 (P)  st 3 way SLR  -HB      Reps 5 (P)  15  -HB         Exercise 6    Exercise Name 6 (P)  LTR  -HB      Reps 6 (P)  10  -HB      Time 6 (P)  10 sec  -HB         Exercise 7    Exercise Name 7 (P)  bridges w/ dynadisc add squeeze  -HB      Reps 7 (P)  20  -HB         Exercise 8    Exercise Name 8 (P)  DKTC rolls   -HB      Reps 8 (P)  20  -HB         Exercise 9    Exercise Name 9 (P)  prone push ups   -HB      Reps 9 (P)  15  -HB         Exercise 10    Exercise Name 10 (P)  THOMAS   -HB      Time 10 (P)  5 minutes  -HB        User Key  (r) = Recorded By, (t) = Taken By, (c) = Cosigned By    Initials Name Provider Type    Michelle Herrera, PT Physical Therapist    Jodi Sharma, ATC                                PT OP Goals     Row Name 09/11/18 1100 09/11/18 1000       PT Short Term Goals    STG Date to Achieve 09/12/18  -CRYSTAL  --    STG 1 I with HEP and have additions/changes by next recertification.  -CRYSTAL  --    STG 1 Progress Ongoing;Progressing;Partially Met  -AJ  --    STG 2 AROM B ROT WNL.  -AJ  --    STG 2 Progress Met  -AJ  --    STG 3 Patient able to show a proper log roll technique.  -AJ  --    STG 3  Progress Ongoing;Progressing;Not Met  -AJ  --    STG 4 L HS 4+/5 or greater.  -AJ  --    STG 4 Progress Met  -AJ  --    STG 5 AROM lumbar flexion >= 75°.  -AJ  --    STG 5 Progress Ongoing;Progressing;Not Met  -AJ  --    STG 6 Patient able toambulate F/R/L 5 min each aquatically with no increase in pain.  -AJ  --    STG 6 Progress Met  -AJ  --    STG 6 Progress Comments .  -AJ  --       Long Term Goals    LTG Date to Achieve 10/05/18  -AJ  --    LTG 1 AROM for lumbar spine all WNL, no increase in pain.  -AJ  --    LTG 1 Progress Ongoing;Progressing;Partially Met  -AJ  --    LTG 2 B LE 5/5.  -AJ  --    LTG 2 Progress Met  -AJ  --    LTG 3 Negative slump B.  -AJ  --    LTG 3 Progress Ongoing;Progressing;Partially Met  -AJ  --    LTG 4 Patient able to perform 15 reps of all aquatics with no increase in pain.  -AJ  --    LTG 4 Progress Ongoing  -AJ  --    LTG 5 I with all aquatic ther ex.  -AJ  --    LTG 5 Progress Ongoing  -AJ  --    LTG 6 Patient to show proper lifting technique floor ot waist to shoulder level.  -AJ  --    LTG 6 Progress Ongoing  -AJ  --    LTG 7 I with land final HEP.  -AJ  --    LTG 7 Progress Ongoing  -AJ  --    LTG 8 D/C with a final HEP and free 30 day fitness fdormula memebership.  -AJ  --    LTG 8 Progress Ongoing  -AJ  --       Time Calculation    PT Goal Re-Cert Due Date 10/02/18  -AJ --  -AJ      User Key  (r) = Recorded By, (t) = Taken By, (c) = Cosigned By    Initials Name Provider Type    Michelle Herrera, PT Physical Therapist          Therapy Education  Given: HEP, Symptoms/condition management, Pain management, Posture/body mechanics (POC)  Program: Reinforced  How Provided: Verbal, Demonstration  Provided to: Patient  Level of Understanding: Verbalized, Demonstrated              Time Calculation:   Start Time: (P) 1025  Stop Time: 1123  Time Calculation (min): (P) 58 min  Total Timed Code Minutes- PT: 58 minute(s)    Therapy Charges for Today     Code Description Service Date  Service Provider Modifiers Qty    83361225530 HC PT THER PROC EA 15 MIN 9/11/2018 Michelle Frank, PT GP 1    62738091650 HC PT THER SUPP EA 15 MIN 9/11/2018 Michelle Frank, PT GP 1      x3 Ther Proc-HB  Jodi Diaz, ATC  Co-sign for HB 10-25-11:10am: Brain Steip, PT      PT G-Codes  Outcome Measure Options: (P) Modifed William  Modified Oswestry Score/Comments: (P) 15/50=36         Michelle Frank, PT, DPT, CSCS  9/11/2018

## 2018-09-13 ENCOUNTER — HOSPITAL ENCOUNTER (OUTPATIENT)
Dept: PHYSICAL THERAPY | Facility: HOSPITAL | Age: 53
Setting detail: THERAPIES SERIES
Discharge: HOME | End: 2018-09-13

## 2018-09-13 DIAGNOSIS — G89.29 CHRONIC MIDLINE LOW BACK PAIN WITHOUT SCIATICA: Primary | ICD-10-CM

## 2018-09-13 DIAGNOSIS — M54.50 CHRONIC MIDLINE LOW BACK PAIN WITHOUT SCIATICA: Primary | ICD-10-CM

## 2018-09-13 PROCEDURE — 97110 THERAPEUTIC EXERCISES: CPT

## 2018-09-13 NOTE — THERAPY TREATMENT NOTE
Outpatient Physical Therapy Ortho Treatment Note  [facility]     Patient Name: Khanh Quinteros  : 1965  MRN: [id number]  Today's Date: 2018      Visit Date: 2018  Pt reports 7/10 pain pre treatment,6 /10 pain post treatment  Reports 15-20% of improvement.  Attended 7/8 visits.  Insurance available: [employer]/medical necessity  Next MD appt: [email or web address] .  Recertification: 10/02/2018.  Visit Dx:    ICD-10-CM ICD-9-CM   1. Chronic midline low back pain without sciatica M54.5 724.2    G89.29 338.29       Patient Active Problem List   Diagnosis   • Chronic midline low back pain   • DDD (degenerative disc disease), lumbar   • Spinal stenosis of lumbar region with neurogenic claudication        Past Medical History:   Diagnosis Date   • Depression    • Diabetes (CMS/HCC)    • Hypertension    • Seizure (CMS/[facility])     As of 2018; reported last seizure was    • Sleep apnea         Past Surgical History:   Procedure Laterality Date   • NECK SURGERY      Fusion in the neck             PT Ortho     Row Name 18 1000       Precautions and Contraindications    Precautions cervical fusion  -TL       Subjective Pain    Able to rate subjective pain? yes  -TL    Pre-Treatment Pain Level 7  -TL    Row Name 18 1000       Subjective Comments    Subjective Comments (P)  reports that he is in ablot of pain this date. states he is unable to do pool tis visit  -HB       Subjective Pain    Able to rate subjective pain? (P)  yes  -HB    Pre-Treatment Pain Level (P)  8  -HB       Posture/Observations    Alignment Options Forward head;Cervical lordosis;Thoracic kyphosis;Rounded shoulders;Scoliosis;Iliac crests;Lumbar lordosis  -AJ    Forward Head Mild;Increased  -AJ    Cervical Lordosis Mild;Increased  -AJ    Thoracic Kyphosis Mild;Increased  -AJ    Rounded Shoulders Bilateral:;Mild;Increased  -AJ    Scoliosis Normal  -AJ    Lumbar lordosis Normal  -AJ    Iliac crests  Bilateral:;Normal   Pelvis is level, no LLD to note  -AJ    Posture/Observations Comments no acute distress   Fair overall postural awareness.  -AJ       Lumbar/SI Special Tests    Standing Flexion Test (SI Dysfunction) Bilateral:;Negative  -AJ    Stork Test (SI Dysfunction) Bilateral:;Negative  -AJ    Trendelenburg Test (Gluteus Medius Weakness) Bilateral:;Negative  -AJ    Slump Test (Neural Tension) Right:;Positive;Left:;Negative  -AJ    Fariba Jaxon Test (HNP) Negative  -AJ    SLR (Neural Tension) Bilateral:;Negative  -AJ    SI Compression Test (SI Dysfunction) Bilateral:;Negative  -AJ    SI Distraction Test (SI Dysfunction) Bilateral:;Negative  -AJ    ROMEO (hip vs. SI Dysfunction) Bilateral:;Negative  -AJ    FAIR Test (Piriformis Syndrome) Bilateral:;Negative  -AJ    Sacral Spring Test (SI Dysfunction) Negative  -AJ       Jeri's Signs    Superficial and non-anatomical tenderness Negative  -AJ    Simulation test Negative  -AJ    Distraction straight leg raise test (sitting vs supine) Negative  -AJ    Regional disturbances Negative  -AJ    Overreaction to examination Negative  -AJ       Head/Neck/Trunk    Trunk Extension AROM 30°  -AJ    Trunk Flexion AROM 58°  -AJ    Trunk Lt Lateral Flexion AROM WNL  -AJ    Trunk Rt Lateral Flexion AROM WNL  -AJ    Trunk Lt Rotation AROM WNL  -AJ    Trunk Rt Rotation AROM WNL  -AJ       MMT (Manual Muscle Testing)    General MMT Comments B LE 5/5  -AJ       Sensation    Sensation WNL? WNL  -AJ    Light Touch No apparent deficits  -AJ       Transfers    Comment (Transfers) I with all transfers, poor log roll technique.  -AJ       Gait/Stairs Assessment/Training    Comment (Gait/Stairs) FWB, non-antalgic gait, no distress.  -AJ      User Key  (r) = Recorded By, (t) = Taken By, (c) = Cosigned By    Initials Name Provider Type    AJ Michelle Frank, PT Physical Therapist    Jodi Sharma, ATC     TL Ольга Caban, PTA Physical Therapy Assistant                             PT Assessment/Plan     Row Name 09/13/18 1700          PT Assessment    Assessment Comments Pt able to tolerate 20 reps ex in aquatic therapy. Pt's pain decrease one point. Pt tolerated prone sLR. Pt needs to work on log rolling  -TL        PT Plan    PT Frequency 2x/week   land/pool  -TL     PT Plan Comments add swiss ball LAQ and march possible shld rows with TBand  -TL       User Key  (r) = Recorded By, (t) = Taken By, (c) = Cosigned By    Initials Name Provider Type    Ольга Jorgensen PTA Physical Therapy Assistant                    Exercises     Row Name 09/13/18 1000             Subjective Comments    Subjective Comments Pt hurting today. Pt walked in without any grimmacing or muscle guarding noted.   pt reports 15-20% improvement.  -TL         Subjective Pain    Able to rate subjective pain? yes  -TL      Pre-Treatment Pain Level 7  -TL      Post-Treatment Pain Level 6  -TL         Exercise 1    Exercise Name 1 Pro 2 L4.5 legs  -TL      Time 1 10 mins  -TL         Exercise 2    Exercise Name 2 st ham S  -TL      Reps 2 2  -TL      Time 2 30 sec  -TL         Exercise 3    Exercise Name 3 seated piriformis   -TL      Reps 3 2   -TL      Time 3 30 sec hold  -TL         Exercise 4    Exercise Name 4 sit to stand w/ lx ext  -TL      Reps 4 20  -TL      Additional Comments 5 sec hold  -TL         Exercise 5    Exercise Name 5 bridges on PBall x  -TL      Reps 5 20  -TL         Exercise 6    Exercise Name 6 DKTC pball  -TL      Reps 6 20  -TL      Time 6 5 sec hold  -TL         Exercise 7    Exercise Name 7 prone push up  -TL         Exercise 8    Exercise Name 8 prone SLR  -TL      Reps 8 15  -TL         Exercise 9    Exercise Name 9 prone push ups   -TL      Sets 9 2  -TL      Reps 9 10  -TL         Exercise 10    Exercise Name 10 aqua: walk with cookie fwd/back/lateral  -TL      Time 10 5 mins each  -TL         Exercise 11    Exercise Name 11 aqua: ms  -TL      Reps 11 20  -TL          Exercise 12    Exercise Name 12 aqua; 3 way SLR  -TL      Reps 12 20  -TL         Exercise 13    Exercise Name 13 aqua: marching  -TL      Time 13 3 mins  -TL         Exercise 14    Exercise Name 14 aqua: KB push and pull  -TL      Reps 14 20  -TL         Exercise 15    Exercise Name 15 Aqua: KB push down  -TL      Reps 15 20  -TL         Exercise 16    Exercise Name 16 aqua: 3-way yp  -TL      Reps 16 20  -TL         Exercise 17    Exercise Name 17 aqua; bike  -TL      Time 17 5 min  -TL         Exercise 18    Exercise Name 18 aqua: Scissors  -TL      Time 18 5 min  -TL         Exercise 19    Exercise Name 19 aqua: Hang  -TL      Time 19 5 mins  -TL        User Key  (r) = Recorded By, (t) = Taken By, (c) = Cosigned By    Initials Name Provider Type    Ольга Jorgensen, PTA Physical Therapy Assistant                               PT OP Goals     Row Name 09/13/18 1000          PT Short Term Goals    STG Date to Achieve 09/12/18  -TL     STG 1 I with HEP and have additions/changes by next recertification.  -TL     STG 1 Progress Ongoing;Progressing;Partially Met  -TL     STG 2 AROM B ROT WNL.  -TL     STG 2 Progress Met  -TL     STG 3 Patient able to show a proper log roll technique.  -TL     STG 3 Progress Ongoing;Progressing;Not Met  -TL     STG 4 L HS 4+/5 or greater.  -TL     STG 4 Progress Met  -TL     STG 5 AROM lumbar flexion >= 75°.  -TL     STG 5 Progress Ongoing;Progressing;Not Met  -TL     STG 6 Patient able toambulate F/R/L 5 min each aquatically with no increase in pain.  -TL     STG 6 Progress Met  -TL     STG 6 Progress Comments .  -TL        Long Term Goals    LTG Date to Achieve 10/05/18  -TL     LTG 1 AROM for lumbar spine all WNL, no increase in pain.  -TL     LTG 1 Progress Ongoing;Progressing;Partially Met  -TL     LTG 2 B LE 5/5.  -TL     LTG 2 Progress Met  -TL     LTG 3 Negative slump B.  -TL     LTG 3 Progress Ongoing;Progressing;Partially Met  -TL     LTG 4 Patient able to perform 15  reps of all aquatics with no increase in pain.  -TL     LTG 4 Progress Ongoing;Progressing  -TL     LTG 5 I with all aquatic ther ex.  -TL     LTG 5 Progress Ongoing;Progressing  -TL     LTG 6 Patient to show proper lifting technique floor ot waist to shoulder level.  -TL     LTG 6 Progress Ongoing  -TL     LTG 7 I with land final HEP.  -TL     LTG 7 Progress Ongoing  -TL     LTG 8 D/C with a final HEP and free 30 day fitness fdormula memebership.  -TL     LTG 8 Progress Ongoing  -TL       User Key  (r) = Recorded By, (t) = Taken By, (c) = Cosigned By    Initials Name Provider Type    Ольга Jorgensen PTA Physical Therapy Assistant          Therapy Education  Education Details: prone SLR  Given: HEP, Pain management, Posture/body mechanics  Program: Reinforced  How Provided: Verbal, Demonstration  Provided to: Patient              Time Calculation:   Start Time: 1013  Stop Time: 1200  Time Calculation (min): 107 min  PT Non-Billable Time (min): 12 min  Total Timed Code Minutes- PT: 95 minute(s)  Therapy Suggested Charges     Code   Minutes Charges    None           Therapy Charges for Today     Code Description Service Date Service Provider Modifiers Qty    25021211350 HC PT THER PROC EA 15 MIN 9/13/2018 Ольга Caban PTA GP 6    56456796299 HC PT THER SUPP EA 15 MIN 9/13/2018 Ольга Caban PTA GP 1                    Ольга Caban PTA  9/13/2018

## 2018-09-19 ENCOUNTER — TELEPHONE (OUTPATIENT)
Dept: PHYSICAL THERAPY | Facility: HOSPITAL | Age: 53
End: 2018-09-19

## 2018-09-20 ENCOUNTER — APPOINTMENT (OUTPATIENT)
Dept: PHYSICAL THERAPY | Facility: HOSPITAL | Age: 53
End: 2018-09-20

## 2018-09-21 ENCOUNTER — HOSPITAL ENCOUNTER (OUTPATIENT)
Dept: PHYSICAL THERAPY | Facility: HOSPITAL | Age: 53
Setting detail: THERAPIES SERIES
Discharge: HOME | End: 2018-09-21

## 2018-09-21 DIAGNOSIS — G89.29 CHRONIC MIDLINE LOW BACK PAIN WITHOUT SCIATICA: Primary | ICD-10-CM

## 2018-09-21 DIAGNOSIS — M54.50 CHRONIC MIDLINE LOW BACK PAIN WITHOUT SCIATICA: Primary | ICD-10-CM

## 2018-09-21 PROCEDURE — 97110 THERAPEUTIC EXERCISES: CPT

## 2018-09-21 NOTE — THERAPY TREATMENT NOTE
Outpatient Physical Therapy Ortho Treatment Note  [facility]  Jodi Diaz ATC         Patient Name: Khanh Quinteros  : 1965  MRN: [id number]  Today's Date: 2018      Visit Date: 2018  Pt reports 6/10 pain pre treatment, 5/10 pain post treatment  Reports 20% of improvement.  Attended 8/10 visits.  Insurance available: medical [facility]  Next MD appt: [id number].  Recertification: 10/02/2018.  Visit Dx:    ICD-10-CM ICD-9-CM   1. Chronic midline low back pain without sciatica M54.5 724.2    G89.29 338.29       Patient Active Problem List   Diagnosis   • Chronic midline low back pain   • DDD (degenerative disc disease), lumbar   • Spinal stenosis of lumbar region with neurogenic claudication        Past Medical History:   Diagnosis Date   • Depression    • Diabetes (CMS/[facility])    • Hypertension    • Seizure (CMS/[facility])     As of 2018; reported last seizure was    • Sleep apnea         Past Surgical History:   Procedure Laterality Date   • NECK SURGERY      Fusion in the neck             PT Ortho     Row Name 18 0800       Subjective Comments    Subjective Comments (P)  reports reports that he is stiff this date but doing alright   -HB       Precautions and Contraindications    Precautions (P)  cervical fusion  -HB       Subjective Pain    Able to rate subjective pain? (P)  yes  -HB    Pre-Treatment Pain Level (P)  6  -HB    Post-Treatment Pain Level (P)  5  -HB       Posture/Observations    Posture/Observations Comments (P)  no acute distress  -HB      User Key  (r) = Recorded By, (t) = Taken By, (c) = Cosigned By    Initials Name Provider Type    HB Jodi Diaz ATC                                     Exercises     Row Name 18 0800             Subjective Comments    Subjective Comments (P)  reports reports that he is stiff this date but doing alright   -HB         Subjective Pain    Able to rate subjective pain? (P)  yes  -HB       Pre-Treatment Pain Level (P)  6  -HB      Post-Treatment Pain Level (P)  5  -HB         Exercise 1    Exercise Name 1 (P)  Pro 2 L4.5 legs  -HB      Time 1 (P)  10 mins  -HB         Exercise 2    Exercise Name 2 (P)  st ham S  -HB      Reps 2 (P)  2  -HB      Time 2 (P)  30 sec  -HB         Exercise 3    Exercise Name 3 (P)  seated piriformis   -HB      Reps 3 (P)  2   -HB      Time 3 (P)  30 sec hold  -HB         Exercise 4    Exercise Name 4 (P)  sit to stand w/ lx ext  -HB      Reps 4 (P)  20  -HB         Exercise 5    Exercise Name 5 (P)  bridges on PBall x  -HB      Reps 5 (P)  20  -HB         Exercise 6    Exercise Name 6 (P)  DKTC pball  -HB      Reps 6 (P)  20  -HB         Exercise 7    Exercise Name 7 (P)  prone push up  -HB      Reps 7 (P)  20  -HB         Exercise 8    Exercise Name 8 (P)  pball high rows   -HB      Reps 8 (P)  20  -HB      Additional Comments (P)  big green  -HB         Exercise 9    Exercise Name 9 (P)  pball LAQ  -HB      Reps 9 (P)  20  -HB         Exercise 10    Exercise Name 10 (P)  pball marching   -HB      Reps 10 (P)  20  -HB         Exercise 11    Exercise Name 11 (P)  THOMAS  -HB      Time 11 (P)  5 minutes  -HB         Exercise 12    Exercise Name 12 (P)  Aqua: walk 3 way   -HB      Time 12 (P)  5 minutes  -HB         Exercise 13    Exercise Name 13 (P)  [place]qua: yellow paddles 3 way   -HB      Reps 13 (P)  20  -HB         Exercise 14    Exercise Name 14 (P)  Aqua: KB pushpulls  -HB      Reps 14 (P)  25  -HB         Exercise 15    Exercise Name 15 (P)  Aqua: KB pushdowns   -HB      Reps 15 (P)  25  -HB         Exercise 16    Exercise Name 16 (P)  [place]qua: SLR 3 way   -HB      Reps 16 (P)  20  -HB         Exercise 17    Exercise Name 17 (P)  Aqua: MS  -HB      Reps 17 (P)  30  -HB         Exercise 18    Exercise Name 18 (P)  Aqua: CR/TR  -HB      Reps 18 (P)  20  -HB         Exercise 19    Exercise Name 19 (P)  Aqua: deep hang   -HB      Time 19 (P)  5 mins  -HB        User Key  (r) =  Recorded By, (t) = Taken By, (c) = Cosigned By    Initials Name Provider Type     Jodi Diaz, ATC                                PT OP Goals     Row Name 09/21/18 0800          Time Calculation    PT Goal Re-Cert Due Date (P)  10/02/18  -       User Key  (r) = Recorded By, (t) = Taken By, (c) = Cosigned By    Initials Name Provider Type     Jodi Diaz, ATC                          Time Calculation:   Start Time: (P) 0800  Stop Time: (P) 0928  Time Calculation (min): (P) 88 min  PT Non-Billable Time (min): (P) 8 min  Total Timed Code Minutes- PT: (P) 80 minute(s)      Therapy Charges for Today     Code Description Service Date Service Provider Modifiers Qty    38126418478 HC PT THER PROC EA 15 MIN 9/21/2018 Jodi Diaz ATC  5                    Jodi Diaz ATC  9/21/2018

## 2018-09-26 ENCOUNTER — APPOINTMENT (OUTPATIENT)
Dept: PHYSICAL THERAPY | Facility: HOSPITAL | Age: 53
End: 2018-09-26

## 2018-10-02 ENCOUNTER — APPOINTMENT (OUTPATIENT)
Dept: PHYSICAL THERAPY | Facility: HOSPITAL | Age: 53
End: 2018-10-02

## 2018-10-04 ENCOUNTER — HOSPITAL ENCOUNTER (OUTPATIENT)
Dept: PHYSICAL THERAPY | Facility: HOSPITAL | Age: 53
Setting detail: THERAPIES SERIES
Discharge: HOME | End: 2018-10-04

## 2018-10-04 DIAGNOSIS — G89.29 CHRONIC MIDLINE LOW BACK PAIN WITHOUT SCIATICA: Primary | ICD-10-CM

## 2018-10-04 DIAGNOSIS — M54.50 CHRONIC MIDLINE LOW BACK PAIN WITHOUT SCIATICA: Primary | ICD-10-CM

## 2018-10-04 PROCEDURE — 97110 THERAPEUTIC EXERCISES: CPT | Performed by: PHYSICAL THERAPIST

## 2018-10-04 PROCEDURE — 97110 THERAPEUTIC EXERCISES: CPT

## 2018-10-04 NOTE — THERAPY DISCHARGE NOTE
Outpatient Physical Therapy Ortho Progress Note/Discharge Summary  [facility]  Michelle Frank, PT, DPT, CSCS       Patient Name: Khanh Quinteros  : 1965  MRN: [id number]  Today's Date: 10/4/2018      Visit Date: 10/04/2018     Pt reports 8/10 pain pre treatment, 5/10 pain post treatment  Reports 15-20% of improvement.  Attended / visits.  Insurance available: medical [facility]  Next MD appt: [id number].  Recertification: N/A    Visit Dx:    ICD-10-CM ICD-9-CM   1. Chronic midline low back pain without sciatica M54.5 724.2    G89.29 338.29       Patient Active Problem List   Diagnosis   • Chronic midline low back pain   • DDD (degenerative disc disease), lumbar   • Spinal stenosis of lumbar region with neurogenic claudication        Past Medical History:   Diagnosis Date   • Depression    • Diabetes (CMS/[facility])    • Hypertension    • Seizure (CMS/[facility])     As of 2018; reported last seizure was    • Sleep apnea         Past Surgical History:   Procedure Laterality Date   • NECK SURGERY      Fusion in the neck     Changes to medications: None noted.    Changes to MD orders: None noted.          PT Ortho     Row Name 10/04/18 0800       Subjective Comments    Subjective Comments Patient reports his pain isup today. He reports PT is helping a little bit.  -AJ       Precautions and Contraindications    Precautions cervical fusion  -AJ       Subjective Pain    Able to rate subjective pain? yes  -AJ    Pre-Treatment Pain Level 8  -AJ       Posture/Observations    Alignment Options Forward head;Cervical lordosis;Thoracic kyphosis;Rounded shoulders;Scoliosis;Iliac crests;Lumbar lordosis  -AJ    Forward Head Mild;Increased  -AJ    Cervical Lordosis Mild;Increased  -AJ    Thoracic Kyphosis Mild;Increased  -AJ    Rounded Shoulders Bilateral:;Mild;Increased  -AJ    Scoliosis Normal  -AJ    Lumbar lordosis Normal  -AJ    Iliac crests Bilateral:;Normal   pelvis is level, no LLD to note   -AJ    Posture/Observations Comments No acute distress, fair overall postural awareness.  -AJ       Lumbar/SI Special Tests    Standing Flexion Test (SI Dysfunction) Bilateral:;Negative  -AJ    Stork Test (SI Dysfunction) Bilateral:;Unable to test  -AJ    Trendelenburg Test (Gluteus Medius Weakness) Bilateral:;Negative  -AJ    Fariba Jaxon Test (HNP) Negative  -AJ    SLR (Neural Tension) Bilateral:;Negative  -AJ    SI Compression Test (SI Dysfunction) Bilateral:;Negative  -AJ    SI Distraction Test (SI Dysfunction) Bilateral:;Negative  -AJ    ROMEO (hip vs. SI Dysfunction) Bilateral:;Negative  -AJ    FAIR Test (Piriformis Syndrome) Bilateral:;Negative  -AJ    Sacral Spring Test (SI Dysfunction) Negative  -AJ       Jeri's Signs    Superficial and non-anatomical tenderness Negative  -AJ    Simulation test Negative  -AJ    Distraction straight leg raise test (sitting vs supine) Negative  -AJ    Regional disturbances Negative  -AJ    Overreaction to examination Negative  -AJ       Head/Neck/Trunk    Trunk Extension AROM 30°  -AJ    Trunk Flexion AROM 65°  -AJ    Trunk Lt Lateral Flexion AROM WNL  -AJ    Trunk Rt Lateral Flexion AROM WNL  -AJ    Trunk Lt Rotation AROM WNL  -AJ    Trunk Rt Rotation AROM WNL  -AJ       MMT (Manual Muscle Testing)    General MMT Comments B LE 5/5  -AJ       Sensation    Sensation WNL? WNL  -AJ    Light Touch No apparent deficits  -AJ    Additional Comments Denies any numbness or tingling  -AJ       Transfers    Comment (Transfers) I with all transers, good log roll technique.  -AJ       Gait/Stairs Assessment/Training    Comment (Gait/Stairs) FWB, non-antalgic gait, no distress.  -AJ      User Key  (r) = Recorded By, (t) = Taken By, (c) = Cosigned By    Initials Name Provider Type    Michelle Herrera, PT Physical Therapist         Barriers to Rehab: Include significant or possible arthritic/degenerative changes that have occurred within the spine.    Safety Issues: None noted.      "     PT Assessment/Plan     Row Name 10/04/18 0800          PT Assessment    Functional Limitations Limitation in home management;Limitations in community activities;Performance in leisure activities;Performance in work activities  -AJ     Impairments Balance;Endurance;Impaired flexibility;Impaired muscle endurance;Impaired muscle length;Impaired muscle power;Range of motion;Posture;Poor body mechanics;Pain;Muscle strength;Joint mobility;Joint integrity  -AJ     Assessment Comments Patient has met most remaining goals. Patient I with land and pool final HEP. Patient also encouraged to keep follow-up appointment with referring provider next week.  -AJ     Rehab Potential Fair  -AJ     Patient/caregiver participated in establishment of treatment plan and goals Yes  -AJ     Patient would benefit from skilled therapy intervention No  -AJ        PT Plan    PT Frequency --   N/A  -AJ     Predicted Duration of Therapy Intervention (Therapy Eval) N/A  -AJ     PT Plan Comments D/C with a final HEP.  -       User Key  (r) = Recorded By, (t) = Taken By, (c) = Cosigned By    Initials Name Provider Type    AJ Michelle Frank, PT Physical Therapist                    Exercises     Row Name 10/04/18 0800             Subjective Comments    Subjective Comments Patient reports his pain isup today. He reports PT is helping a little bit.  -AJ         Subjective Pain    Able to rate subjective pain? yes  -AJ      Pre-Treatment Pain Level 8  -AJ         Exercise 1    Exercise Name 1 Pro II LE  -AJ      Time 1 8 minutes  -AJ      Additional Comments L 6.0  -AJ         Exercise 2    Exercise Name 2 B St. HS S  -AJ      Reps 2 2  -AJ      Time 2 30 seconds  -AJ         Exercise 3    Exercise Name 3 B seated piriformis S  -AJ      Reps 3 2  -AJ      Time 3 30 seconds  -AJ         Exercise 4    Exercise Name 4 Bridges with UE \"X\"  -AJ      Reps 4 20  -AJ      Time 4 5\" hold  -AJ         Exercise 5    Exercise Name 5 LTR  -AJ      Reps " "5 10  -AJ      Time 5 10\" hold  -AJ         Exercise 6    Exercise Name 6 (P)  Aqua: 3 way walk   -HB      Time 6 (P)  5 min  -HB         Exercise 7    Exercise Name 7 (P)  Aqu: cookie pushdown   -HB      Reps 7 (P)  20  -HB         Exercise 8    Exercise Name 8 (P)  Aqua: cookie pushdowns   -HB      Reps 8 (P)  20  -HB         Exercise 9    Exercise Name 9 (P)  Aqua: 4 way paddles   -HB      Reps 9 (P)  30  -HB         Exercise 10    Exercise Name 10 (P)  [place]: SLR 3 way   -HB      Reps 10 (P)  30  -HB         Exercise 11    Exercise Name 11 (P)  [place]qua: deep bike   -HB      Time 11 (P)  5 minutes  -HB         Exercise 12    Exercise Name 12 (P)  Aqua:deep scissor   -HB      Time 12 (P)  5 minutes  -HB         Exercise 13    Exercise Name 13 (P)  Aqua: deep hang   -HB      Time 13 (P)  5 minutes  -HB        User Key  (r) = Recorded By, (t) = Taken By, (c) = Cosigned By    Initials Name Provider Type    Michelle Herrera, PT Physical Therapist    HB Jodi Diaz, ATC                                PT OP Goals     Row Name 10/04/18 0800          PT Short Term Goals    STG Date to Achieve 09/12/18  -     STG 1 I with HEP and have additions/changes by next recertification.  -AJ     STG 1 Progress Met  -     STG 2 AROM B ROT WNL.  -     STG 2 Progress Met  -     STG 3 Patient able to show a proper log roll technique.  -     STG 3 Progress Met  -     STG 4 L HS 4+/5 or greater.  -     STG 4 Progress Met  -     STG 5 AROM lumbar flexion >= 75°.  -     STG 5 Progress Not Met  -     STG 6 Patient able toambulate F/R/L 5 min each aquatically with no increase in pain.  -     STG 6 Progress Met  -     STG 6 Progress Comments .  -        Long Term Goals    LTG Date to Achieve 10/05/18  -     LTG 1 AROM for lumbar spine all WNL, no increase in pain.  -AJ     LTG 1 Progress Partially Met  -     LTG 2 B LE 5/5.  -     LTG 2 Progress Met  -     LTG 3 Negative slump B.  -AJ     " LTG 3 Progress Met  -     LTG 4 Patient able to perform 15 reps of all aquatics with no increase in pain.  -     LTG 4 Progress Met  -     LTG 5 I with all aquatic ther ex.  -     LTG 5 Progress Ongoing;Progressing  -     LTG 6 Patient to show proper lifting technique floor ot waist to shoulder level.  -     [facility] 6 [facility]  [facility]  -[facility] 7 Progress Met  -[facility] 8 D/C with a final HEP and free 30 day fitness fdormula memebership.  -     LTG 8 Progress Partially Met  -[facility] 8 Progress Comments Does not qualify for free 30 days due to no show appointments.  -       User Key  (r) = Recorded By, (t) = Taken By, (c) = Cosigned By    Initials Name Provider Type    Michelle Herrera, PT Physical Therapist                         Time Calculation:   Start Time: 0820  Stop Time: 0848  Total Time: 28 minutes  Total Time Coded Minutes- PT- 28 minutes  Michelle Frank PT, DPT, CSCS    Combined Total Time- 71 minutes  Combined Time Coded Minutes- 71 minutes    Start Time: (P) 0857  Stop Time: (P) 0940  Time Calculation (min): (P) 43 min  Total Timed Code Minutes- PT: (P) 43 minute(s)   Jodi Diaz ATC      Therapy Charges for Today     Code Description Service Date Service Provider Modifiers Qty    80354101173 HC PT THER PROC EA 15 MIN 10/4/2018 Michelle Frank, PT GP 2    34873540170 HC PT THER SUPP EA 15 MIN 10/4/2018 Michelle Frank PT GP 1      x3 Ther Proc-HB  x1 Ther Sup-HB  Jodi Diaz, ATC              OP PT Discharge Summary  Date of Discharge: 10/04/18  Reason for Discharge: Independent  Outcomes Achieved: Patient able to partially acheive established goals, Refer to plan of care for updates on goals achieved  Discharge Destination: Home with home program  Discharge Instructions/Additional Comments: D/C today with a final HEP.      Michelle Frank PT, DPT, CSCS  10/4/2018

## 2018-10-08 ENCOUNTER — OFFICE VISIT (OUTPATIENT)
Dept: ORTHOPEDIC SURGERY | Facility: CLINIC | Age: 53
End: 2018-10-08

## 2018-10-08 VITALS — HEIGHT: 72 IN | WEIGHT: 219 LBS | BODY MASS INDEX: 29.66 KG/M2

## 2018-10-08 DIAGNOSIS — M51.36 DDD (DEGENERATIVE DISC DISEASE), LUMBAR: Primary | ICD-10-CM

## 2018-10-08 DIAGNOSIS — M48.062 SPINAL STENOSIS OF LUMBAR REGION WITH NEUROGENIC CLAUDICATION: ICD-10-CM

## 2018-10-08 PROCEDURE — 99213 OFFICE O/P EST LOW 20 MIN: CPT | Performed by: ORTHOPAEDIC SURGERY

## 2018-10-08 RX ORDER — DULOXETIN HYDROCHLORIDE 60 MG/1
60 CAPSULE, DELAYED RELEASE ORAL DAILY
COMMUNITY

## 2018-10-08 NOTE — PROGRESS NOTES
"Khanh Quinteros is a 52 y.o. male returns for     Chief Complaint   Patient presents with   • Lumbar Spine - Follow-up, Pain       HISTORY OF PRESENT ILLNESS: continued pain in lumbar spine  Pain scale today 7/10  51 y/o complains of back pain, low back, the pain is dull, intermittent.  Worse with low pressure weather patterns.  C/o numbness of both legs left > right.  Aleve helps.  Flexeril helps.    No weakness  No nighttime awakening pain.  Physical therapy has helped.         CONCURRENT MEDICAL HISTORY:    The following portions of the patient's history were reviewed and updated as appropriate: allergies, current medications, past family history, past medical history, past social history, past surgical history and problem list.     ROS  No fevers or chills.  No chest pain or shortness of air.  No GI or  disturbances.    PHYSICAL EXAMINATION:       Ht 182.9 cm (72\")   Wt 99.3 kg (219 lb)   BMI 29.70 kg/m²     Physical Exam   Constitutional: He is oriented to person, place, and time. He appears well-developed and well-nourished. No distress.   HENT:   Head: Normocephalic.   Mouth/Throat: No oropharyngeal exudate.   Eyes: Pupils are equal, round, and reactive to light. No scleral icterus.   Neck: No JVD present. No tracheal deviation present. No thyromegaly present.   Cardiovascular: Normal rate and intact distal pulses.    Pulmonary/Chest: Effort normal. No respiratory distress. He has no wheezes. He has no rales.   Abdominal: Soft. He exhibits no distension. There is no tenderness. There is no guarding.   Neurological: He is alert and oriented to person, place, and time. He displays normal reflexes. No cranial nerve deficit. Coordination normal.   Skin: Skin is warm and dry. Capillary refill takes less than 2 seconds. No erythema.   Psychiatric: He has a normal mood and affect. His behavior is normal.       GAIT:     []  Normal  []  Antalgic    Assistive device: [x]  None  []  Walker     []  Crutches  []  " Cane     []  Wheelchair  []  Stretcher    Right Ankle Exam     Muscle Strength   Dorsiflexion:  5/5  Plantar flexion:  5/5  Anterior tibial:  5/5  Posterior tibial:  5/5  Gastrocsoleus:  5/5  Peroneal muscle:  5/5      Left Ankle Exam     Muscle Strength   Dorsiflexion:  5/5   Plantar flexion:  5/5   Anterior tibial:  5/5   Posterior tibial:  5/5  Gastrocsoleus:  5/5  Peroneal muscle:  5/5      Right Knee Exam     Range of Motion   Extension: normal   Flexion: normal     Muscle Strength     The patient has normal right knee strength.    Other   Erythema: absent  Scars: absent      Left Knee Exam     Range of Motion   Extension: normal   Flexion: normal     Muscle Strength     The patient has normal left knee strength.    Other   Erythema: absent  Scars: absent      Back Exam     Tenderness   The patient is experiencing tenderness in the lumbar.    Range of Motion   Extension: 10   Flexion: 60   Lateral Bend Right: 10   Lateral Bend Left: 10   Rotation Right: 10   Rotation Left: 10     Muscle Strength   Right Quadriceps:  5/5   Left Quadriceps:  5/5   Right Hamstrings:  5/5   Left Hamstrings:  5/5     Tests   Straight leg raise right: negative  Straight leg raise left: negative    Other   Erythema: no back redness  Scars: absent              No results found.    I reviewed the MRI of the lumbar spine, which demonstrates degenerative facet changes, no significant spinal stenosis      ASSESSMENT:    Diagnoses and all orders for this visit:    DDD (degenerative disc disease), lumbar  -     Back Brace    Spinal stenosis of lumbar region with neurogenic claudication  -     Back Brace    Other orders  -     DULoxetine (CYMBALTA) 60 MG capsule; Take 60 mg by mouth Daily.          PLAN    I offered an epidural steroid injection, again, which he does not wish to proceed.    Prescription is ordered for low back brace, ASPEN LSO.    \    Javan Matos MD

## 2018-10-09 ENCOUNTER — APPOINTMENT (OUTPATIENT)
Dept: PHYSICAL THERAPY | Facility: HOSPITAL | Age: 53
End: 2018-10-09

## 2018-10-11 ENCOUNTER — APPOINTMENT (OUTPATIENT)
Dept: PHYSICAL THERAPY | Facility: HOSPITAL | Age: 53
End: 2018-10-11

## 2021-09-30 ENCOUNTER — APPOINTMENT (OUTPATIENT)
Dept: URBAN - METROPOLITAN AREA CLINIC 265 | Age: 56
Setting detail: DERMATOLOGY
End: 2021-09-30

## 2021-09-30 DIAGNOSIS — L30.9 DERMATITIS, UNSPECIFIED: ICD-10-CM

## 2021-09-30 PROCEDURE — OTHER PRESCRIPTION MEDICATION MANAGEMENT: OTHER

## 2021-09-30 PROCEDURE — 99203 OFFICE O/P NEW LOW 30 MIN: CPT

## 2021-09-30 PROCEDURE — OTHER KOH PREP: OTHER

## 2021-09-30 PROCEDURE — OTHER MIPS QUALITY: OTHER

## 2021-09-30 PROCEDURE — OTHER COUNSELING: OTHER

## 2021-09-30 PROCEDURE — OTHER PRESCRIPTION: OTHER

## 2021-09-30 RX ORDER — NAFTIFINE HYDROCHLORIDE 2 G/100G
GEL TOPICAL
Qty: 45 | Refills: 0 | Status: ERX | COMMUNITY
Start: 2021-09-30

## 2021-09-30 ASSESSMENT — LOCATION SIMPLE DESCRIPTION DERM
LOCATION SIMPLE: RIGHT CHEEK
LOCATION SIMPLE: LEFT CHEEK
LOCATION SIMPLE: CHEST

## 2021-09-30 ASSESSMENT — LOCATION DETAILED DESCRIPTION DERM
LOCATION DETAILED: STERNUM
LOCATION DETAILED: LEFT MEDIAL INFERIOR CHEST
LOCATION DETAILED: LEFT CENTRAL MALAR CHEEK
LOCATION DETAILED: RIGHT CENTRAL MALAR CHEEK

## 2021-09-30 ASSESSMENT — LOCATION ZONE DERM
LOCATION ZONE: TRUNK
LOCATION ZONE: FACE

## 2021-09-30 NOTE — PROCEDURE: KOH PREP
Showing: +/- results
Cpt Desired: 
Koh Procedure Text (Tissue Harvesting Technique): A 15-blade scalpel was used to scrape the skin. The skin scrapings were placed on a glass slide, covered with a coverslip and a KOH solution was applied.
Koh Intro Text (From The.....): A KOH prep was ordered and evaluated from the
Detail Level: Detailed

## 2021-09-30 NOTE — HPI: RASH
What Type Of Note Output Would You Prefer (Optional)?: Bullet Format
How Severe Is Your Rash?: moderate
Is This A New Presentation, Or A Follow-Up?: Rash
no

## 2021-09-30 NOTE — PROCEDURE: PRESCRIPTION MEDICATION MANAGEMENT
Initiate Treatment: Naftin 2 % topical gel, Apply daily to affected areas for until clear then continue for an additional week
Render In Strict Bullet Format?: No
Detail Level: Zone

## 2021-10-15 ENCOUNTER — APPOINTMENT (OUTPATIENT)
Dept: URBAN - METROPOLITAN AREA CLINIC 265 | Age: 56
Setting detail: DERMATOLOGY
End: 2021-10-15

## 2021-10-15 DIAGNOSIS — L30.9 DERMATITIS, UNSPECIFIED: ICD-10-CM

## 2021-10-15 PROCEDURE — OTHER PRESCRIPTION: OTHER

## 2021-10-15 PROCEDURE — OTHER PHOTO-DOCUMENTATION: OTHER

## 2021-10-15 PROCEDURE — 99213 OFFICE O/P EST LOW 20 MIN: CPT

## 2021-10-15 PROCEDURE — OTHER PRESCRIPTION MEDICATION MANAGEMENT: OTHER

## 2021-10-15 PROCEDURE — OTHER COUNSELING: OTHER

## 2021-10-15 RX ORDER — KETOCONAZOLE 20 MG/G
CREAM TOPICAL
Qty: 30 | Refills: 3 | Status: ERX | COMMUNITY
Start: 2021-10-15

## 2021-10-15 ASSESSMENT — LOCATION SIMPLE DESCRIPTION DERM
LOCATION SIMPLE: RIGHT CHEEK
LOCATION SIMPLE: LEFT CHEEK
LOCATION SIMPLE: CHEST

## 2021-10-15 ASSESSMENT — LOCATION ZONE DERM
LOCATION ZONE: FACE
LOCATION ZONE: TRUNK

## 2021-10-15 ASSESSMENT — LOCATION DETAILED DESCRIPTION DERM
LOCATION DETAILED: LEFT CENTRAL MALAR CHEEK
LOCATION DETAILED: STERNUM
LOCATION DETAILED: RIGHT CENTRAL MALAR CHEEK

## 2021-10-15 NOTE — PROCEDURE: PRESCRIPTION MEDICATION MANAGEMENT
Render In Strict Bullet Format?: No
Detail Level: Zone
Discontinue Regimen: Naftin 2 % topical gel
Plan: Will re-evaluate at previously scheduled f/u
Initiate Treatment: ketoconazole 2 % topical cream, apply to affected areas twice a day until resolved, then 1 week past clearance

## 2021-11-11 ENCOUNTER — APPOINTMENT (OUTPATIENT)
Dept: URBAN - METROPOLITAN AREA CLINIC 265 | Age: 56
Setting detail: DERMATOLOGY
End: 2021-11-11

## 2021-11-11 VITALS — HEIGHT: 70 IN | WEIGHT: 220 LBS | RESPIRATION RATE: 18 BRPM

## 2021-11-11 DIAGNOSIS — L30.9 DERMATITIS, UNSPECIFIED: ICD-10-CM

## 2021-11-11 PROCEDURE — OTHER PHOTO-DOCUMENTATION: OTHER

## 2021-11-11 PROCEDURE — OTHER PRESCRIPTION MEDICATION MANAGEMENT: OTHER

## 2021-11-11 PROCEDURE — OTHER COUNSELING: OTHER

## 2021-11-11 PROCEDURE — OTHER MIPS QUALITY: OTHER

## 2021-11-11 PROCEDURE — 99213 OFFICE O/P EST LOW 20 MIN: CPT

## 2021-11-11 ASSESSMENT — LOCATION SIMPLE DESCRIPTION DERM
LOCATION SIMPLE: LEFT CHEEK
LOCATION SIMPLE: CHEST
LOCATION SIMPLE: RIGHT CHEEK

## 2021-11-11 ASSESSMENT — LOCATION ZONE DERM
LOCATION ZONE: FACE
LOCATION ZONE: TRUNK

## 2021-11-11 ASSESSMENT — LOCATION DETAILED DESCRIPTION DERM
LOCATION DETAILED: STERNUM
LOCATION DETAILED: LEFT CENTRAL MALAR CHEEK
LOCATION DETAILED: RIGHT CENTRAL MALAR CHEEK

## 2021-11-11 NOTE — PROCEDURE: PRESCRIPTION MEDICATION MANAGEMENT
Detail Level: Zone
Render In Strict Bullet Format?: No
Modify Regimen: ketoconazole 2 % topical cream, apply to affected areas twice a day until resolved, then 1 week past clearance (use for any flares)
Plan: Return to clinic as needed.

## 2023-03-22 ENCOUNTER — APPOINTMENT (OUTPATIENT)
Dept: URBAN - METROPOLITAN AREA CLINIC 265 | Age: 58
Setting detail: DERMATOLOGY
End: 2023-03-31

## 2023-03-22 VITALS — WEIGHT: 227 LBS | HEIGHT: 70 IN | RESPIRATION RATE: 18 BRPM

## 2023-03-22 DIAGNOSIS — Z71.89 OTHER SPECIFIED COUNSELING: ICD-10-CM

## 2023-03-22 DIAGNOSIS — L82.1 OTHER SEBORRHEIC KERATOSIS: ICD-10-CM

## 2023-03-22 DIAGNOSIS — L81.4 OTHER MELANIN HYPERPIGMENTATION: ICD-10-CM

## 2023-03-22 DIAGNOSIS — L21.8 OTHER SEBORRHEIC DERMATITIS: ICD-10-CM

## 2023-03-22 DIAGNOSIS — D18.0 HEMANGIOMA: ICD-10-CM

## 2023-03-22 DIAGNOSIS — D49.2 NEOPLASM OF UNSPECIFIED BEHAVIOR OF BONE, SOFT TISSUE, AND SKIN: ICD-10-CM

## 2023-03-22 DIAGNOSIS — D22 MELANOCYTIC NEVI: ICD-10-CM

## 2023-03-22 PROBLEM — D18.01 HEMANGIOMA OF SKIN AND SUBCUTANEOUS TISSUE: Status: ACTIVE | Noted: 2023-03-22

## 2023-03-22 PROBLEM — D22.5 MELANOCYTIC NEVI OF TRUNK: Status: ACTIVE | Noted: 2023-03-22

## 2023-03-22 PROCEDURE — OTHER PRESCRIPTION MEDICATION MANAGEMENT: OTHER

## 2023-03-22 PROCEDURE — OTHER COUNSELING: OTHER

## 2023-03-22 PROCEDURE — 99213 OFFICE O/P EST LOW 20 MIN: CPT | Mod: 25

## 2023-03-22 PROCEDURE — OTHER PRESCRIPTION: OTHER

## 2023-03-22 PROCEDURE — OTHER MIPS QUALITY: OTHER

## 2023-03-22 PROCEDURE — 11102 TANGNTL BX SKIN SINGLE LES: CPT

## 2023-03-22 PROCEDURE — OTHER BIOPSY BY SHAVE METHOD: OTHER

## 2023-03-22 RX ORDER — KETOCONAZOLE 20 MG/ML
SHAMPOO, SUSPENSION TOPICAL
Qty: 120 | Refills: 11 | Status: ERX | COMMUNITY
Start: 2023-03-22

## 2023-03-22 RX ORDER — KETOCONAZOLE 20 MG/G
CREAM TOPICAL
Qty: 60 | Refills: 11 | Status: ERX

## 2023-03-22 ASSESSMENT — LOCATION DETAILED DESCRIPTION DERM
LOCATION DETAILED: EPIGASTRIC SKIN
LOCATION DETAILED: RIGHT MEDIAL MALAR CHEEK
LOCATION DETAILED: INFERIOR THORACIC SPINE
LOCATION DETAILED: LEFT MEDIAL INFERIOR CHEST
LOCATION DETAILED: RIGHT INFERIOR MEDIAL FOREHEAD
LOCATION DETAILED: SUPERIOR LUMBAR SPINE
LOCATION DETAILED: PERIUMBILICAL SKIN

## 2023-03-22 ASSESSMENT — LOCATION ZONE DERM
LOCATION ZONE: FACE
LOCATION ZONE: TRUNK

## 2023-03-22 ASSESSMENT — LOCATION SIMPLE DESCRIPTION DERM
LOCATION SIMPLE: LOWER BACK
LOCATION SIMPLE: UPPER BACK
LOCATION SIMPLE: RIGHT CHEEK
LOCATION SIMPLE: ABDOMEN
LOCATION SIMPLE: CHEST
LOCATION SIMPLE: RIGHT FOREHEAD

## 2023-03-22 NOTE — PROCEDURE: PRESCRIPTION MEDICATION MANAGEMENT
Initiate Treatment: Ketoconazole shampoo apply to scalp, let sit for 10-15 minutes then rinse
Continue Regimen: Ketoconazole topical cream apply to affected areas of rash on face and neck twice a day until resolved, then 1 week past clearance
Plan: Return to clinic as needed.
Detail Level: Simple
Render In Strict Bullet Format?: No

## 2023-03-22 NOTE — PROCEDURE: BIOPSY BY SHAVE METHOD
Validate Note Data (See Information Below): No
Cryotherapy Text: The wound bed was treated with cryotherapy after the biopsy was performed.
Additional Anesthesia Volume In Cc (Will Not Render If 0): 0
Dressing: bandage
Post-Care Instructions: I reviewed with the patient in detail post-care instructions. Patient is to keep the biopsy site dry overnight, and then apply bacitracin twice daily until healed. Patient may apply hydrogen peroxide soaks to remove any crusting.
Depth Of Biopsy: dermis
Type Of Destruction Used: Curettage
Silver Nitrate Text: The wound bed was treated with silver nitrate after the biopsy was performed.
Wound Care: Petrolatum
Detail Level: Detailed
Billing Type: Third-Party Bill
Anesthesia Type: 1% lidocaine with epinephrine
Biopsy Method: Dermablade
Electrodesiccation Text: The wound bed was treated with electrodesiccation after the biopsy was performed.
Hemostasis: Drysol
Consent: Written consent was obtained and risks were reviewed including but not limited to scarring, infection, bleeding, scabbing, incomplete removal, nerve damage and allergy to anesthesia.
Curettage Text: The wound bed was treated with curettage after the biopsy was performed.
Information: Selecting Yes will display possible errors in your note based on the variables you have selected. This validation is only offered as a suggestion for you. PLEASE NOTE THAT THE VALIDATION TEXT WILL BE REMOVED WHEN YOU FINALIZE YOUR NOTE. IF YOU WANT TO FAX A PRELIMINARY NOTE YOU WILL NEED TO TOGGLE THIS TO 'NO' IF YOU DO NOT WANT IT IN YOUR FAXED NOTE.
Notification Instructions: Patient will be notified of biopsy results. However, patient instructed to call the office if not contacted within 2 weeks.
Was A Bandage Applied: Yes
Biopsy Type: H and E
Electrodesiccation And Curettage Text: The wound bed was treated with electrodesiccation and curettage after the biopsy was performed.
Anesthesia Volume In Cc (Will Not Render If 0): 0.5

## 2024-10-10 ENCOUNTER — APPOINTMENT (OUTPATIENT)
Dept: URBAN - METROPOLITAN AREA CLINIC 298 | Age: 59
Setting detail: DERMATOLOGY
End: 2024-10-21

## 2024-10-10 VITALS — HEIGHT: 70 IN | WEIGHT: 217 LBS | RESPIRATION RATE: 18 BRPM

## 2024-10-10 DIAGNOSIS — L21.8 OTHER SEBORRHEIC DERMATITIS: ICD-10-CM

## 2024-10-10 PROCEDURE — OTHER PRESCRIPTION MEDICATION MANAGEMENT: OTHER

## 2024-10-10 PROCEDURE — OTHER MIPS QUALITY: OTHER

## 2024-10-10 PROCEDURE — 99213 OFFICE O/P EST LOW 20 MIN: CPT

## 2024-10-10 PROCEDURE — OTHER COUNSELING: OTHER

## 2024-10-10 PROCEDURE — OTHER PRESCRIPTION: OTHER

## 2024-10-10 RX ORDER — CICLOPIROX OLAMINE 7.7 MG/G
CREAM TOPICAL
Qty: 90 | Refills: 5 | Status: ERX | COMMUNITY
Start: 2024-10-10

## 2024-10-10 RX ORDER — CICLOPIROX 10 MG/.96ML
SHAMPOO TOPICAL
Qty: 120 | Refills: 12 | Status: ERX | COMMUNITY
Start: 2024-10-10

## 2024-10-10 RX ORDER — TACROLIMUS 1 MG/G
OINTMENT TOPICAL
Qty: 60 | Refills: 4 | Status: CANCELLED | COMMUNITY
Start: 2024-10-10

## 2024-10-10 ASSESSMENT — LOCATION SIMPLE DESCRIPTION DERM
LOCATION SIMPLE: RIGHT FOREHEAD
LOCATION SIMPLE: CHEST

## 2024-10-10 ASSESSMENT — LOCATION ZONE DERM
LOCATION ZONE: FACE
LOCATION ZONE: TRUNK

## 2024-10-10 ASSESSMENT — LOCATION DETAILED DESCRIPTION DERM
LOCATION DETAILED: LEFT MEDIAL INFERIOR CHEST
LOCATION DETAILED: RIGHT INFERIOR MEDIAL FOREHEAD

## 2024-10-10 NOTE — PROCEDURE: PRESCRIPTION MEDICATION MANAGEMENT
Render In Strict Bullet Format?: No
Plan: Patient has failed ketoconazole shampoo and cream. \\nWill reevaluate in 3 months.
Discontinue Regimen: Ketoconazole cream and ketoconazole shampoo ( previously prescribed)
Detail Level: Zone
Initiate Treatment: ciclopirox 1 % shampoo \\nQuantity: 120.0 ml  Days Supply: 30\\nSig: Lather onto a dry scalp and leave in for 25-30 minutes then rinse. Repeat up to 3 time a week.\\n\\nLoprox (as olamine) 0.77 % topical cream \\nQuantity: 90.0 g  Days Supply: 30\\nSig: Apply twice a day to face as needed. Moisturize after.\\n\\ntacrolimus 0.1 % topical ointment \\nQuantity: 60.0 g  Days Supply: 30\\nSig: Apply to affected areas twice daily

## 2024-10-14 RX ORDER — TACROLIMUS 1 MG/G
OINTMENT TOPICAL
Qty: 60 | Refills: 4 | Status: ERX